# Patient Record
Sex: MALE | Race: WHITE | Employment: UNEMPLOYED | URBAN - METROPOLITAN AREA
[De-identification: names, ages, dates, MRNs, and addresses within clinical notes are randomized per-mention and may not be internally consistent; named-entity substitution may affect disease eponyms.]

---

## 2018-06-07 ENCOUNTER — OFFICE VISIT (OUTPATIENT)
Dept: ORTHOPEDIC SURGERY | Age: 23
End: 2018-06-07

## 2018-06-07 VITALS
RESPIRATION RATE: 16 BRPM | DIASTOLIC BLOOD PRESSURE: 60 MMHG | HEIGHT: 78 IN | BODY MASS INDEX: 31.59 KG/M2 | WEIGHT: 273 LBS | SYSTOLIC BLOOD PRESSURE: 124 MMHG

## 2018-06-07 DIAGNOSIS — S61.411A LACERATION OF RIGHT HAND WITHOUT FOREIGN BODY, INITIAL ENCOUNTER: Primary | ICD-10-CM

## 2018-06-07 PROCEDURE — 4004F PT TOBACCO SCREEN RCVD TLK: CPT | Performed by: ORTHOPAEDIC SURGERY

## 2018-06-07 PROCEDURE — G8417 CALC BMI ABV UP PARAM F/U: HCPCS | Performed by: ORTHOPAEDIC SURGERY

## 2018-06-07 PROCEDURE — 99203 OFFICE O/P NEW LOW 30 MIN: CPT | Performed by: ORTHOPAEDIC SURGERY

## 2018-06-07 PROCEDURE — G8427 DOCREV CUR MEDS BY ELIG CLIN: HCPCS | Performed by: ORTHOPAEDIC SURGERY

## 2018-06-21 ENCOUNTER — OFFICE VISIT (OUTPATIENT)
Dept: ORTHOPEDIC SURGERY | Age: 23
End: 2018-06-21

## 2018-06-21 VITALS
SYSTOLIC BLOOD PRESSURE: 122 MMHG | DIASTOLIC BLOOD PRESSURE: 60 MMHG | RESPIRATION RATE: 16 BRPM | HEIGHT: 78 IN | BODY MASS INDEX: 31.59 KG/M2 | WEIGHT: 273 LBS

## 2018-06-21 DIAGNOSIS — S61.411A LACERATION OF RIGHT HAND WITHOUT FOREIGN BODY, INITIAL ENCOUNTER: Primary | ICD-10-CM

## 2018-06-21 PROCEDURE — G8427 DOCREV CUR MEDS BY ELIG CLIN: HCPCS | Performed by: ORTHOPAEDIC SURGERY

## 2018-06-21 PROCEDURE — 99212 OFFICE O/P EST SF 10 MIN: CPT | Performed by: ORTHOPAEDIC SURGERY

## 2018-06-21 PROCEDURE — G8417 CALC BMI ABV UP PARAM F/U: HCPCS | Performed by: ORTHOPAEDIC SURGERY

## 2018-06-21 PROCEDURE — 4004F PT TOBACCO SCREEN RCVD TLK: CPT | Performed by: ORTHOPAEDIC SURGERY

## 2021-02-25 NOTE — PROGRESS NOTES
Date: 2/26/2021                                               Subjective/Objective:     Chief Complaint   Patient presents with    New Patient     pt has a family hx of diabetes, also concerned about his weight gain    Hypertension       HPI     Ambreen Amador is a 22 yo male, new patient. Here to establish care. Here with nurse from [facility]. Works at [email or web address]. Lives at group home. History of paranoid psychosis - 2016. Was admitted to [facility]. Schizophrenia versus delusional disorder. Was started on Invega injections, now on [place]. Follows with psychiatry at [facility]- has been seeing via telehealth. Is followed every 3 months. Nurse with him today believes he was diagnosed with either schizophrenia or schizoaffective disorder. Nurse reports patient is compliant with his injections and psychiatry follow-up. Patient reports that his mood has been doing well. He reports that he feels overall happy. He denies thoughts of harming himself. He does report a suicide attempts with a knife as a child. Denies any other suicide attempts. Does feel down that he can't live with family. Has been living at group home for about a year, he enjoys living there. Sees his dad usually about one month. His mom he sees every week, usually stays at her house one night a week and he enjoys this. He reports that he was previously treated for depression with sertraline. He reports that he stopped taking this because he did not like the way it made him feel. He endorses being very thirsty and hungry \"every once in a while. \"  Nurse with patient today reports that mother expresses concern for diabetes given that there is no reported family history of diabetes. Walks to and from work everyday - 20 minute walk. Works at [email or web address], eats at work. Drinks sodas. Works 5 am - noon. Usually eats breakfast at work.      Age/Gender Health Maintenance     Lipid -needs  DM Screen -needs  Smoked cigarettes for about one month then quit     Tetanus - UTD 6/2018     HIV Screening - low risk  Hep C Screening- low risk           Patient Active Problem List    Diagnosis Date Noted    Laceration of right hand without foreign body 06/07/2018    Schizophreniform disorder with good prognostic features ([facility] [address] 11/10/2016    Psychological trauma 11/10/2016    Tourette's syndrome 11/10/2016    Impaired social interaction 11/10/2016    Suicidal ideation 11/10/2016    History of OCD (obsessive compulsive disorder)        Past Medical History:   Diagnosis Date    ADHD (attention deficit hyperactivity disorder)     dx young child    Bipolar 1 disorder, manic, mild ([facility] [address]     dx as a child    History of OCD (obsessive compulsive disorder)     dx as a child    Tourette syndrome     dx as a child       Past Surgical History:   Procedure Laterality Date    TONSILLECTOMY         Admission on 11/08/2016, Discharged on 11/09/2016   Component Date Value Ref Range Status    Amphetamine Screen, Urine 11/08/2016 Neg  Negative <1000ng/mL Final    Barbiturate Screen, Ur 11/08/2016 Neg  Negative <200 ng/mL Final    Benzodiazepine Screen, Urine 11/08/2016 Neg  Negative <200 ng/mL Final    Cannabinoid Scrn, Ur 11/08/2016 Neg  Negative <50 ng/mL Final    Cocaine Metabolite Screen, Urine 11/08/2016 Neg  Negative <300 ng/mL Final    Opiate Scrn, Ur 11/08/2016 Neg  Negative <300 ng/mL Final    Comment: \"Therapeutic levels of pain medication, especially oxycontin and synthetic  opioids, may not be detected by this Methodology. Pain management screen  panel  Drug panel-PM-Hi Res Ur, Interp (PAIN) should be considered for drug  monitoring \".       PCP Screen, Urine 11/08/2016 Neg  Negative <25 ng/mL Final    Methadone Screen, Urine 11/08/2016 Neg  Negative <300 ng/mL Final    Propoxyphene Scrn, Ur 11/08/2016 Neg  Negative <300 ng/mL Final    pH, UA 11/08/2016 7.0   Final    Comment: Urine pH less than 5.0 or greater than 8.0 may indicate sample adulteration. Another sample should be collected if clinically  indicated.  Drug Screen Comment: 11/08/2016 see below   Final    Comment: This method is a screening test to detect only these drug  classes as part of a medical workup. Confirmatory testing  by another method should be ordered if clinically indicated.  Oxycodone Urine 11/08/2016 Neg  Negative <100 ng/ml Final    Acetaminophen Level 11/08/2016 <15  10 - 30 ug/mL Final    Comment: Therapeutic Range: 10.0-30.0 ug/mL  Toxic: >851.8 ug/mL      Salicylate, Serum 70/91/1889 <0.3* 15.0 - 30.0 mg/dL Final    Comment: Therapeutic Range: 15.0-30.0 mg/dL  Toxic: >30.0 mg/dL      Ethanol Lvl 11/08/2016 None Detected  mg/dL Final    Comment:    None Detected  Conversion factor:  100 mg/dl = .100 g/dl  For Medical Purposes Only      Sodium 11/08/2016 142  136 - 145 mmol/L Final    Potassium 11/08/2016 3.9  3.5 - 5.1 mmol/L Final    Chloride 11/08/2016 101  99 - 110 mmol/L Final    CO2 11/08/2016 26  21 - 32 mmol/L Final    Anion Gap 11/08/2016 15  3 - 16 Final    Glucose 11/08/2016 97  70 - 99 mg/dL Final    BUN 11/08/2016 <2* 7 - 20 mg/dL Final    CREATININE 11/08/2016 1.0  0.9 - 1.3 mg/dL Final    GFR Non- 11/08/2016 >60  >60 Final    Comment: >60 mL/min/1.73m2 EGFR, calc. for ages 25 and older using the  MDRD formula (not corrected for weight), is valid for stable  renal function.  GFR  11/08/2016 >60  >60 Final    Comment: Chronic Kidney Disease: less than 60 ml/min/1.73 sq.m. Kidney Failure: less than 15 ml/min/1.73 sq.m. Results valid for patients 18 years and older.       Calcium 11/08/2016 9.7  8.3 - 10.6 mg/dL Final    Total Protein 11/08/2016 7.2  6.4 - 8.2 g/dL Final    Albumin 11/08/2016 4.4  3.4 - 5.0 g/dL Final    Albumin/Globulin Ratio 11/08/2016 1.6  1.1 - 2.2 Final    Total Bilirubin 11/08/2016 0.4  0.0 - 1.0 mg/dL Final    Alkaline Phosphatase 11/08/2016 77  40 - 129 U/L Final    ALT 11/08/2016 16  10 - 40 U/L Final    AST 11/08/2016 17  15 - 37 U/L Final    Globulin 11/08/2016 2.8  g/dL Final    WBC 11/08/2016 8.9  4.0 - 11.0 K/uL Final    RBC 11/08/2016 5.46  4.20 - 5.90 M/uL Final    Hemoglobin 11/08/2016 15.4  13.5 - 17.5 g/dL Final    Hematocrit 11/08/2016 45.6  40.5 - 52.5 % Final    MCV 11/08/2016 83.6  80.0 - 100.0 fL Final    MCH 11/08/2016 28.3  26.0 - 34.0 pg Final    MCHC 11/08/2016 33.9  31.0 - 36.0 g/dL Final    RDW 11/08/2016 13.0  12.4 - 15.4 % Final    Platelets 64/37/9808 148  135 - 450 K/uL Final    MPV 11/08/2016 9.2  5.0 - 10.5 fL Final    Neutrophils % 11/08/2016 72.9  % Final    Lymphocytes % 11/08/2016 20.8  % Final    Monocytes % 11/08/2016 5.8  % Final    Eosinophils % 11/08/2016 0.2  % Final    Basophils % 11/08/2016 0.3  % Final    Neutrophils Absolute 11/08/2016 6.5  1.7 - 7.7 K/uL Final    Lymphocytes Absolute 11/08/2016 1.9  1.0 - 5.1 K/uL Final    Monocytes Absolute 11/08/2016 0.5  0.0 - 1.3 K/uL Final    Eosinophils Absolute 11/08/2016 0.0  0.0 - 0.6 K/uL Final    Basophils Absolute 11/08/2016 0.0  0.0 - 0.2 K/uL Final    Mono Test 11/08/2016 Negative  Negative Final    Rapid Influenza A Ag 11/08/2016 Negative  Negative Final    Rapid Influenza B Ag 11/08/2016 Negative  Negative Final    TSH Reflex FT4 11/08/2016 2.46  0.43 - 4.00 uIU/mL Final    Ventricular Rate 11/08/2016 70  BPM Final    Atrial Rate 11/08/2016 70  BPM Final    P-R Interval 11/08/2016 178  ms Final    QRS Duration 11/08/2016 86  ms Final    Q-T Interval 11/08/2016 344  ms Final    QTc Calculation (Bazett) 11/08/2016 371  ms Final    P Axis 11/08/2016 44  degrees Final    R Axis 11/08/2016 -6  degrees Final    T Axis 11/08/2016 31  degrees Final    Diagnosis 11/08/2016    Final                    Value:Sinus rhythm with marked sinus arrhythmia  Otherwise normal ECG  No previous ECGs available  Confirmed by [facility] MD, Marilu Parsons ([phone]) on 11/9/2016 8:39:37 AM      Color, UA 11/08/2016 Yellow  Straw/Yellow Final    Clarity, UA 11/08/2016 Clear  Clear Final    Glucose, Ur 11/08/2016 Negative  Negative mg/dL Final    Bilirubin Urine 11/08/2016 Negative  Negative Final    Ketones, Urine 11/08/2016 Negative  Negative mg/dL Final    Specific [place], UA 11/08/2016 1.010  1.005 - 1.030 Final    Blood, Urine 11/08/2016 Negative  Negative Final    pH, UA 11/08/2016 7.0  5.0 - 8.0 Final    Protein, UA 11/08/2016 Negative  Negative mg/dL Final    Urobilinogen, Urine 11/08/2016 0.2  <2.0 E.U./dL Final    Nitrite, Urine 11/08/2016 Negative  Negative Final    Leukocyte Esterase, Urine 11/08/2016 Negative  Negative Final    Microscopic Examination 11/08/2016 Not Indicated   Final    Urine Reflex to Culture 11/08/2016 Not Indicated   Final    Urine Type 11/08/2016 Not Specified   Final       Family History   Problem Relation Age of Onset    Mental Illness Paternal Grandfather     Depression Mother     Anxiety Disorder Mother        Current Outpatient Medications   Medication Sig Dispense Refill    INVEGA TRINZA 546 MG/1.75ML RONNIE IM injection        No current facility-administered medications for this visit. Allergies   Allergen Reactions    Amoxicillin Hives       Review of Systems   Constitutional: Negative for chills and fever. Respiratory: Negative for cough and shortness of breath. Cardiovascular: Negative for chest pain. Gastrointestinal: Negative for constipation, diarrhea, nausea and vomiting. Endocrine: Positive for polydipsia and polyphagia. Negative for polyuria. Neurological: Negative for dizziness and headaches. Psychiatric/Behavioral: Negative for sleep disturbance and suicidal ideas. Vitals:  /79   Pulse 78   Temp 97 °F (36.1 °C) (Temporal)   Ht 6' 6\" (1.981 m)   Wt (!) 313 lb (142 kg)   BMI 36.17 kg/m²     Physical Exam  Constitutional:       General: He is not in acute distress.      Appearance: Normal appearance. He is obese. HENT:      Head: Normocephalic and atraumatic. Neck:      Musculoskeletal: Normal range of motion. Cardiovascular:      Rate and Rhythm: Normal rate and regular rhythm. Pulses: Normal pulses. Heart sounds: Normal heart sounds. No murmur. Pulmonary:      Effort: Pulmonary effort is normal. No respiratory distress. Breath sounds: Normal breath sounds. Abdominal:      General: There is no distension. Palpations: Abdomen is soft. Musculoskeletal: Normal range of motion. Skin:     General: Skin is warm and dry. Neurological:      General: No focal deficit present. Mental Status: He is alert and oriented to person, place, and time. Mental status is at baseline. Psychiatric:         Mood and Affect: Mood normal.         Behavior: Behavior normal.         Thought Content: Thought content normal.         Judgment: Judgment normal.         Assessment/Plan     1. Schizophrenia, unspecified type [facility]): Invega injections, follows with [facility]. 2. Depression, unspecified depression type: denies SI. He feels his mood is doing well. Follows with psychiatry at [facility]. 3. Family history of diabetes mellitus  - Hemoglobin A1C; Future    4. Obesity (BMI 35.0-39.9 without comorbidity): Advised diet and lifestyle modifications  - Lipid, Fasting; Future  - Hemoglobin A1C; Future    5. Preventative health care  - COMPREHENSIVE METABOLIC PANEL; Future  - TSH with Reflex; Future  - CBC WITH AUTO DIFFERENTIAL;  Future  - VITAMIN D 25 HYDROXY; Future      Orders Placed This Encounter   Procedures    COMPREHENSIVE METABOLIC PANEL     Standing Status:   Future     Standing Expiration Date:   2/25/2022    TSH with Reflex     Standing Status:   Future     Standing Expiration Date:   2/25/2022    CBC WITH AUTO DIFFERENTIAL     Standing Status:   Future     Standing Expiration Date:   2/25/2022    VITAMIN D 25 HYDROXY     Standing Status:   Future     Standing Expiration Date: 2/25/2022    Hemoglobin A1C     Standing Status:   Future     Standing Expiration Date:   2/26/2022    Lipid, Fasting     Standing Status:   Future     Standing Expiration Date:   2/26/2022       Return in about 6 months (around 8/26/2021). OR sooner with questions, concerns, worsening symptoms    SARAH RICHTER NP    2/26/2021  4:52 PM    Discussed use, benefit, and side effects of prescribed medications. Barriers to medication compliance addressed. Discussed all ordered testing and labs. All patient questions answered. Patient agreeable with plan above. Please note that this chart was generated using dragon dictation software. Although every effort was made to ensure the accuracy of this automated transcription, some errors in transcription may have occurred.

## 2021-02-25 NOTE — PATIENT INSTRUCTIONS
Complete labs  Goal is to exercise (moderate intensity) for at least 150 min a week. Exercising at least 3-4 days a week is best.   Reduce soda intake, goal is to stop drinking it all together.

## 2021-02-26 ENCOUNTER — OFFICE VISIT (OUTPATIENT)
Dept: PRIMARY CARE CLINIC | Age: 26
End: 2021-02-26
Payer: COMMERCIAL

## 2021-02-26 VITALS
TEMPERATURE: 97 F | HEART RATE: 78 BPM | DIASTOLIC BLOOD PRESSURE: 79 MMHG | WEIGHT: 313 LBS | HEIGHT: 78 IN | BODY MASS INDEX: 36.21 KG/M2 | SYSTOLIC BLOOD PRESSURE: 139 MMHG

## 2021-02-26 DIAGNOSIS — Z00.00 PREVENTATIVE HEALTH CARE: ICD-10-CM

## 2021-02-26 DIAGNOSIS — F20.9 SCHIZOPHRENIA, UNSPECIFIED TYPE (HCC): Primary | ICD-10-CM

## 2021-02-26 DIAGNOSIS — F32.A DEPRESSION, UNSPECIFIED DEPRESSION TYPE: ICD-10-CM

## 2021-02-26 DIAGNOSIS — Z83.3 FAMILY HISTORY OF DIABETES MELLITUS: ICD-10-CM

## 2021-02-26 DIAGNOSIS — E66.9 OBESITY (BMI 35.0-39.9 WITHOUT COMORBIDITY): ICD-10-CM

## 2021-02-26 PROCEDURE — G8484 FLU IMMUNIZE NO ADMIN: HCPCS | Performed by: NURSE PRACTITIONER

## 2021-02-26 PROCEDURE — 4004F PT TOBACCO SCREEN RCVD TLK: CPT | Performed by: NURSE PRACTITIONER

## 2021-02-26 PROCEDURE — 99203 OFFICE O/P NEW LOW 30 MIN: CPT | Performed by: NURSE PRACTITIONER

## 2021-02-26 PROCEDURE — G8417 CALC BMI ABV UP PARAM F/U: HCPCS | Performed by: NURSE PRACTITIONER

## 2021-02-26 PROCEDURE — G8427 DOCREV CUR MEDS BY ELIG CLIN: HCPCS | Performed by: NURSE PRACTITIONER

## 2021-02-26 RX ORDER — PALIPERIDONE PALMITATE 546 MG/1.75ML
INJECTION, SUSPENSION, EXTENDED RELEASE INTRAMUSCULAR
COMMUNITY
Start: 2021-01-20

## 2021-02-26 ASSESSMENT — ENCOUNTER SYMPTOMS
NAUSEA: 0
SHORTNESS OF BREATH: 0
CONSTIPATION: 0
COUGH: 0
DIARRHEA: 0
VOMITING: 0

## 2021-03-02 DIAGNOSIS — Z00.00 PREVENTATIVE HEALTH CARE: ICD-10-CM

## 2021-03-02 DIAGNOSIS — E66.9 OBESITY (BMI 35.0-39.9 WITHOUT COMORBIDITY): ICD-10-CM

## 2021-03-02 DIAGNOSIS — Z83.3 FAMILY HISTORY OF DIABETES MELLITUS: ICD-10-CM

## 2021-03-02 LAB
A/G RATIO: 1.6 (ref 1.1–2.2)
ALBUMIN SERPL-MCNC: 4.7 G/DL (ref 3.4–5)
ALP BLD-CCNC: 100 U/L (ref 40–129)
ALT SERPL-CCNC: 17 U/L (ref 10–40)
ANION GAP SERPL CALCULATED.3IONS-SCNC: 18 MMOL/L (ref 3–16)
AST SERPL-CCNC: 16 U/L (ref 15–37)
BASOPHILS ABSOLUTE: 0 K/UL (ref 0–0.2)
BASOPHILS RELATIVE PERCENT: 0.3 %
BILIRUB SERPL-MCNC: 0.7 MG/DL (ref 0–1)
BUN BLDV-MCNC: 15 MG/DL (ref 7–20)
CALCIUM SERPL-MCNC: 9.9 MG/DL (ref 8.3–10.6)
CHLORIDE BLD-SCNC: 104 MMOL/L (ref 99–110)
CHOLESTEROL, FASTING: 196 MG/DL (ref 0–199)
CO2: 20 MMOL/L (ref 21–32)
CREAT SERPL-MCNC: 1.1 MG/DL (ref 0.9–1.3)
EOSINOPHILS ABSOLUTE: 0 K/UL (ref 0–0.6)
EOSINOPHILS RELATIVE PERCENT: 0.3 %
GFR AFRICAN AMERICAN: >60
GFR NON-AFRICAN AMERICAN: >60
GLOBULIN: 3 G/DL
GLUCOSE BLD-MCNC: 72 MG/DL (ref 70–99)
HCT VFR BLD CALC: 46.2 % (ref 40.5–52.5)
HDLC SERPL-MCNC: 38 MG/DL (ref 40–60)
HEMOGLOBIN: 15.3 G/DL (ref 13.5–17.5)
LDL CHOLESTEROL CALCULATED: 146 MG/DL
LYMPHOCYTES ABSOLUTE: 2.2 K/UL (ref 1–5.1)
LYMPHOCYTES RELATIVE PERCENT: 27.9 %
MCH RBC QN AUTO: 28.2 PG (ref 26–34)
MCHC RBC AUTO-ENTMCNC: 33 G/DL (ref 31–36)
MCV RBC AUTO: 85.4 FL (ref 80–100)
MONOCYTES ABSOLUTE: 0.4 K/UL (ref 0–1.3)
MONOCYTES RELATIVE PERCENT: 5.5 %
NEUTROPHILS ABSOLUTE: 5.3 K/UL (ref 1.7–7.7)
NEUTROPHILS RELATIVE PERCENT: 66 %
PDW BLD-RTO: 13.7 % (ref 12.4–15.4)
PLATELET # BLD: 153 K/UL (ref 135–450)
PMV BLD AUTO: 10.6 FL (ref 5–10.5)
POTASSIUM SERPL-SCNC: 4 MMOL/L (ref 3.5–5.1)
RBC # BLD: 5.41 M/UL (ref 4.2–5.9)
SODIUM BLD-SCNC: 142 MMOL/L (ref 136–145)
TOTAL PROTEIN: 7.7 G/DL (ref 6.4–8.2)
TRIGLYCERIDE, FASTING: 60 MG/DL (ref 0–150)
TSH REFLEX: 1.31 UIU/ML (ref 0.27–4.2)
VITAMIN D 25-HYDROXY: 24.5 NG/ML
VLDLC SERPL CALC-MCNC: 12 MG/DL
WBC # BLD: 8 K/UL (ref 4–11)

## 2021-03-03 LAB
ESTIMATED AVERAGE GLUCOSE: 99.7 MG/DL
HBA1C MFR BLD: 5.1 %

## 2021-03-04 DIAGNOSIS — E55.9 VITAMIN D INSUFFICIENCY: Primary | ICD-10-CM

## 2021-03-04 RX ORDER — MELATONIN
1000 DAILY
Qty: 30 TABLET | Refills: 5 | Status: SHIPPED | OUTPATIENT
Start: 2021-03-04 | End: 2022-01-12

## 2021-04-14 ENCOUNTER — NURSE TRIAGE (OUTPATIENT)
Dept: OTHER | Facility: CLINIC | Age: 26
End: 2021-04-14

## 2021-04-14 ENCOUNTER — OFFICE VISIT (OUTPATIENT)
Dept: PRIMARY CARE CLINIC | Age: 26
End: 2021-04-14
Payer: COMMERCIAL

## 2021-04-14 VITALS
HEIGHT: 78 IN | HEART RATE: 88 BPM | WEIGHT: 305.8 LBS | OXYGEN SATURATION: 99 % | BODY MASS INDEX: 35.38 KG/M2 | SYSTOLIC BLOOD PRESSURE: 118 MMHG | DIASTOLIC BLOOD PRESSURE: 83 MMHG | TEMPERATURE: 98.6 F

## 2021-04-14 DIAGNOSIS — K64.2 GRADE III HEMORRHOIDS: ICD-10-CM

## 2021-04-14 DIAGNOSIS — R19.7 DIARRHEA, UNSPECIFIED TYPE: ICD-10-CM

## 2021-04-14 DIAGNOSIS — K64.2 GRADE III HEMORRHOIDS: Primary | ICD-10-CM

## 2021-04-14 PROCEDURE — 1036F TOBACCO NON-USER: CPT | Performed by: INTERNAL MEDICINE

## 2021-04-14 PROCEDURE — G8427 DOCREV CUR MEDS BY ELIG CLIN: HCPCS | Performed by: INTERNAL MEDICINE

## 2021-04-14 PROCEDURE — G8417 CALC BMI ABV UP PARAM F/U: HCPCS | Performed by: INTERNAL MEDICINE

## 2021-04-14 PROCEDURE — 99215 OFFICE O/P EST HI 40 MIN: CPT | Performed by: INTERNAL MEDICINE

## 2021-04-14 RX ORDER — LOPERAMIDE HYDROCHLORIDE 2 MG/1
2 CAPSULE ORAL 4 TIMES DAILY PRN
COMMUNITY
Start: 2021-04-14 | End: 2021-05-17

## 2021-04-14 RX ORDER — HYDROCORTISONE 25 MG/G
CREAM TOPICAL 2 TIMES DAILY
Qty: 60 G | Refills: 1 | Status: SHIPPED | OUTPATIENT
Start: 2021-04-14 | End: 2021-09-13

## 2021-04-14 ASSESSMENT — ENCOUNTER SYMPTOMS
EYE PAIN: 0
NAUSEA: 0
ANAL BLEEDING: 1
ABDOMINAL DISTENTION: 0
BLOOD IN STOOL: 1
EYE ITCHING: 0
RECTAL PAIN: 1
PHOTOPHOBIA: 0
ABDOMINAL PAIN: 1
VOMITING: 0
EYE REDNESS: 0
RESPIRATORY NEGATIVE: 1
DIARRHEA: 1
EYE DISCHARGE: 0
CONSTIPATION: 1

## 2021-04-14 NOTE — PROGRESS NOTES
Nadine Boateng (:  1995) is a 23 y.o. male,Established patient, here for evaluation of the following chief complaint(s):  Constipation (DIARRHEA, BLEEDING AND CONSTIPATION  x 1year)  The patient admits to rectal bleeding for weeks and pain with defecation. He states he had this evaluated before but nothing was done about it. He also has diarrhea off and on and occasional constipation. He works at [employer] and eats that diet every day. He was counseled on improving his diet and weight loss. ASSESSMENT/PLAN:  1. Grade III hemorrhoids  -     hydrocortisone (ANUSOL-HC) 2.5 % CREA rectal cream; Place rectally 2 times daily, Rectal, 2 TIMES DAILY Starting Wed 2021, Disp-60 g, R-1, Normal  -     CBC WITH AUTO DIFFERENTIAL; Future  -     loperamide (IMODIUM) 2 MG capsule; Take 1 capsule by mouth 4 times daily as needed for Hraunás MD Kenrick, Colorectal Surgery, [facility]  2. Diarrhea, unspecified type  -     CLOSTRIDIUM DIFFICILE RAPID EIA; Future  -     Basic metabolic panel; Future  -     Amylase; Future  -     Comprehensive metabolic panel; Future  -     Ova and parasite screen; Future  -     Occult blood x 1, stool; Future  -     Fecal leukocytes; Future  -     CULTURE STOOL #1; Future      Return in about 2 months (around 2021). SUBJECTIVE/OBJECTIVE:  HPI    Review of Systems   Constitutional: Negative for activity change, appetite change, chills, diaphoresis, fatigue, fever and unexpected weight change. HENT: Negative. Eyes: Negative for photophobia, pain, discharge, redness, itching and visual disturbance. Respiratory: Negative. Cardiovascular: Negative. Gastrointestinal: Positive for abdominal pain, anal bleeding, blood in stool, constipation, diarrhea and rectal pain. Negative for abdominal distention, nausea and vomiting. Genitourinary: Negative. Musculoskeletal: Negative. Skin: Negative. Neurological: Negative. Psychiatric/Behavioral: Negative. Physical Exam  Constitutional:       Appearance: He is well-developed. HENT:      Head: Normocephalic and atraumatic. Eyes:      Conjunctiva/sclera: Conjunctivae normal.      Pupils: Pupils are equal, round, and reactive to light. Neck:      Musculoskeletal: Normal range of motion and neck supple. Cardiovascular:      Rate and Rhythm: Normal rate and regular rhythm. Heart sounds: Normal heart sounds. Pulmonary:      Effort: Pulmonary effort is normal.      Breath sounds: Normal breath sounds. Abdominal:      General: Abdomen is flat. There is no distension. Palpations: Abdomen is soft. There is no mass. Tenderness: There is no abdominal tenderness. There is no guarding or rebound. Hernia: No hernia is present. Musculoskeletal: Normal range of motion. Skin:     General: Skin is warm and dry. Neurological:      Mental Status: He is alert and oriented to person, place, and time. Psychiatric:         Behavior: Behavior normal.         Thought Content: Thought content normal.           On this date 4/14/2021 I have spent 40 minutes reviewing previous notes, test results and face to face with the patient discussing the diagnosis and importance of compliance with the treatment plan as well as documenting on the day of the visit. An electronic signature was used to authenticate this note.     --Yogesh Buenrostro MD col

## 2021-04-14 NOTE — TELEPHONE ENCOUNTER
Received call from [facility] at pre-service center [facility]) [address] with Red Flag Complaint. Brief description of triage: Pt and pt's health  are on line for pt stated intermittent diarrhea, constipation and lately has been using 4 rolls of toilet paper and is \"chaffing down there until it like bleeds around the butt hole. \" Pt denies blood in stool. States last night he had blood when wiping, but did not notice any in the bowl or in watery stool. Pt states diarrhea x2-3 days and then constipation x2-3 and the cycle will repeat. Pt has taken miralax in past to help with this. Triage indicates for patient to be seen by PCP within 3 days. Care advice provided, patient verbalizes understanding; denies any other questions or concerns; instructed to call back for any new or worsening symptoms. Writer provided warm transfer to [facility] at [facility] for appointment scheduling. Attention Provider: Thank you for allowing me to participate in the care of your patient. The patient was connected to triage in response to information provided to the E[facility]. Please do not respond through this encounter as the response is not directed to a shared pool. Reason for Disposition   Patient wants to be seen    Answer Assessment - Initial Assessment Questions  1. DIARRHEA SEVERITY: \"How bad is the diarrhea? \" \"How many extra stools have you had in the past 24 hours than normal?\"     - NO DIARRHEA (SCALE 0)    - MILD (SCALE 1-3): Few loose or mushy BMs; increase of 1-3 stools over normal daily number of stools; mild increase in ostomy output. -  MODERATE (SCALE 4-7): Increase of 4-6 stools daily over normal; moderate increase in ostomy output. * SEVERE (SCALE 8-10; OR 'WORST POSSIBLE'): Increase of 7 or more stools daily over normal; moderate increase in ostomy output; incontinence. Pt states quite a few per week-2-3 x per week, sometimes its a few days without pooping    2.  ONSET: \"When did the diarrhea begin?\"       Last night    3. BM CONSISTENCY: \"How loose or watery is the diarrhea? \"       Pt states loose and watery last night    4. VOMITING: \"Are you also vomiting? \" If so, ask: \"How many times in the past 24 hours? \"       Denies    5. ABDOMINAL PAIN: Stormy Chacho you having any abdominal pain? \" If yes: \"What does it feel like? \" (e.g., crampy, dull, intermittent, constant)       Intermittent abd pain    6. ABDOMINAL PAIN SEVERITY: If present, ask: \"How bad is the pain? \"  (e.g., Scale 1-10; mild, moderate, or severe)    - MILD (1-3): doesn't interfere with normal activities, abdomen soft and not tender to touch     - MODERATE (4-7): interferes with normal activities or awakens from sleep, tender to touch     - SEVERE (8-10): excruciating pain, doubled over, unable to do any normal activities        Pt denies pain right now    7. ORAL INTAKE: If vomiting, \"Have you been able to drink liquids? \" \"How much fluids have you had in the past 24 hours? \"      Pt eating and drinking well for him    8. HYDRATION: \"Any signs of dehydration? \" (e.g., dry mouth [not just dry lips], too weak to stand, dizziness, new weight loss) \"When did you last urinate? \"      Pt states he lost 10 lbs in 2 weeks to a month    9. EXPOSURE: \"Have you traveled to a foreign country recently? \" \"Have you been exposed to anyone with diarrhea? \" \"Could you have eaten any food that was spoiled? \"      Denies    10. ANTIBIOTIC USE: \"Are you taking antibiotics now or have you taken antibiotics in the past 2 months? \"        Denies    11. OTHER SYMPTOMS: \"Do you have any other symptoms? \" (e.g., fever, blood in stool)        Pt states blood when he wipes, chaffing and using 4 rolls of toilet paper per week    12. PREGNANCY: \"Is there any chance you are pregnant? \" \"When was your last menstrual period? \"        N/A    Protocols used: [id number]

## 2021-04-14 NOTE — ASSESSMENT & PLAN NOTE
Uncontrolled, continue current treatment plan     Referred to colorectal surgery. Lab review as indicated. Start on Anusol cream and a stool softener.

## 2021-04-14 NOTE — PATIENT INSTRUCTIONS
ounce-equivalents of grains, such as cereals, breads, crackers, rice, or pasta, every day. An ounce-equivalent is 1 slice of bread, 1 cup of ready-to-eat cereal, or ½ cup of cooked rice, cooked pasta, or cooked cereal.  ? 2½ cups of vegetables, especially:  § Dark-green vegetables such as broccoli and spinach. § Orange vegetables such as carrots and sweet potatoes. § Dry beans (such as ugarte and kidney beans) and peas (such as lentils). ? 2 cups of fresh, frozen, or canned fruit. A small apple or 1 banana or orange equals 1 cup. ? 3 cups of nonfat or low-fat milk, yogurt, or other milk products. ? 5½ ounces of meat and beans, such as chicken, fish, lean meat, beans, nuts, and seeds. One egg, 1 tablespoon of peanut butter, ½ ounce nuts or seeds, or ¼ cup of cooked beans equals 1 ounce of meat. · Learn how to read food labels for serving sizes and ingredients. Fast-food and convenience-food meals often contain few or no fruits or vegetables. Make sure you eat some fruits and vegetables to make the meal more nutritious. · Look at your food diary. For each food group, add up what you have eaten and then divide the total by the number of days. This will give you an idea of how much you are eating from each food group. See if you can find some ways to change your diet to make it more healthy. Start small  · Do not try to make dramatic changes to your diet all at once. You might feel that you are missing out on your favorite foods and then be more likely to fail. · Start slowly, and gradually change your habits. Try some of the following:  ? Use whole wheat bread instead of white bread. ? Use nonfat or low-fat milk instead of whole milk. ? Eat brown rice instead of white rice, and eat whole wheat pasta instead of white-flour pasta. ? Try low-fat cheeses and low-fat yogurt. ? Add more fruits and vegetables to meals and have them for snacks. ? Add lettuce, tomato, cucumber, and onion to sandwiches.   ? Add fruit to yogurt and cereal.  Enjoy food  · You can still eat your favorite foods. You just may need to eat less of them. If your favorite foods are high in fat, salt, and sugar, limit how often you eat them, but do not cut them out entirely. · Eat a wide variety of foods. Make healthy choices when eating out  · The type of restaurant you choose can help you make healthy choices. Even fast-food chains are now offering more low-fat or healthier choices on the menu. · Choose smaller portions, or take half of your meal home. · When eating out, try:  ? A veggie pizza with a whole wheat crust or grilled chicken (instead of sausage or pepperoni). ? Pasta with roasted vegetables, grilled chicken, or marinara sauce instead of cream sauce. ? A vegetable wrap or grilled chicken wrap. ? Broiled or poached food instead of fried or breaded items. Make healthy choices easy  · Buy packaged, prewashed, ready-to-eat fresh vegetables and fruits, such as baby carrots, salad mixes, and chopped or shredded broccoli and cauliflower. · Buy packaged, presliced fruits, such as melon or pineapple. · Choose 100% fruit or vegetable juice instead of soda. Limit juice intake to 4 to 6 oz (½ to ¾ cup) a day. · Blend low-fat yogurt, fruit juice, and canned or frozen fruit to make a smoothie for breakfast or a snack. Where can you learn more? Go to https://Send Word NowsadiEtive Technologies.healthImmunovative Therapies. org and sign in to your [employer] account. Enter G604 in the [facility] box to learn more about \"Eating Healthy Foods: Care Instructions. \"     If you do not have an account, please click on the \"Sign Up Now\" link. Current as of: December 17, 2020               Content Version: 12.8  © 2006-2021 Healthwise, Incorporated. Care instructions adapted under license by [facility] ([facility]).  If you have questions about a medical condition or this instruction, always ask your healthcare professional. [place] [address] any warranty or liability for your use of this information. Patient Education        7 Tips for Eating Healthy When [facility] All the Time    Make quick meals on busy nights. Try recipes that are simple to make and easy to clean up, like pasta, soups, or casseroles. These dishes can often be made ahead of time and are easy to reheat when you're short on time. Buy foods that last.  Choose fresh foods, such as onions, garlic, and potatoes. You can also reach for frozen, canned, and dried foods. Foods like these last and can help you pull a healthy meal together quickly. Wil Nim something new. Try checking out cookbooks from [employer]. Or search for new recipes online. You can also change the ingredients in an old favorite recipe. Or switch the seasonings to create something new. Try theme nights. Try \"Taco Tuesday. \" Do \"Meatless Monday\" for a vegetarian meal each week. And save \"Leftovers Night\" for days when you don't want to cook. Let your favorite dishes guide you. Then make them again every few weeks. Wil Nim with a friend. Maybe you know someone who's feeling the same way about healthy eating. Pick a recipe and schedule a night to make it \"together. \" You can also video call while you cook or eat. Share food with other people. If you like cooking and baking, you can share what you've made. Split up what you've made and keep only what you want to eat. You can wrap up the rest to share with a friend, neighbor, or family member. Aim for balance, variety, and moderation. On most days, eat from each food groupgrains, protein foods, vegetables, fruits, and dairy. And choose different foods from each group. For example, if you often eat apples, try reaching for a banana instead. All foods, if you eat them in moderation, can be part of healthy eating. Current as of: December 17, 2020               Content Version: 12.8  © 6277-8821 Healthwise, Incorporated.    Care instructions adapted under license by [facility]. If you have questions about a medical condition or this instruction, always ask your healthcare professional. [address] any warranty or liability for your use of this information. Patient Education        Learning About Healthy Weight  What is a healthy weight? A healthy weight is the weight at which you feel good about yourself and have energy for work and play. It's also one that lowers your risk for health problems. What can you do to stay at a healthy weight? It can be hard to stay at a healthy weight, especially when fast food, vending-machine snacks, and processed foods are so easy to find. And with your busy lifestyle, activity may be low on your list of things to do. But staying at a healthy weight may be easier than you think. Here are some dos and don'ts for staying at a healthy weight. Do eat healthy foods  The kinds of foods you eat have a big impact on both your weight and your health. Reaching and staying at a healthy weight is not about going on a diet. It's about making healthier food choices every day and changing your diet for good. Healthy eating means eating a variety of foods so that you get all the nutrients you need. Your body needs protein, carbohydrate, and fats for energy. They keep your heart beating, your brain active, and your muscles working. On most days, try to eat from each food group. This means eating a variety of:  · Whole grains, such as whole wheat breads and pastas. · Fruits and vegetables. · Dairy products, such as low-fat milk, yogurt, and cheese. · Lean proteins, such as all types of fish, chicken without the skin, and beans. Don't have too much or too little of one thing. All foods, if eaten in moderation, can be part of healthy eating. Even sweets can be okay. If your favorite foods are high in fat, salt, sugar, or calories, limit how often you eat them. Eat smaller servings, or look for healthy substitutes.   Do watch what you eat  Many people eat more than their bodies need. Part of staying at a healthy weight means learning how much food you really need from day to day and not eating more than that. Even with healthy foods, eating too much can make you gain weight. Having a well-balanced diet means that you eat enough, but not too much, and that your food gives you the nutrients you need to stay healthy. So listen to your body. Eat when you're hungry. Stop when you feel satisfied. It's a good idea to have healthy snacks ready for when you get hungry. Keep healthy snacks with you at work, in your car, and at home. If you have a healthy snack easily available, you'll be less likely to pick a candy bar or bag of chips from a vending machine instead. Some healthy snacks you might want to keep on hand are fruit, low-fat yogurt, string cheese, low-fat microwave popcorn, raisins and other dried fruit, nuts, whole wheat crackers, pretzels, carrots, celery sticks, and broccoli. Do some physical activity  A big part of reaching and staying at a healthy weight is being active. When you're active, you burn calories. This makes it easier to reach and stay at a healthy weight. When you're active on a regular basis, your body burns more calories, even when you're at rest. Being active helps you lose fat and build lean muscle. Try to be active for at least 1 hour every day. This may sound like a lot, but it's okay to be active in smaller blocks of time that add up to 1 hour a day. Any activity that makes your heart beat faster and keeps it there for a while counts. A brisk walk, run, or swim will get your heart beating faster. So will climbing stairs, shooting baskets, or cycling. Even some household chores like vacuuming and mowing the lawn will get your heart rate up. Pick activities that you enjoyones that make your heart beat faster, your muscles stronger, and your muscles and joints more flexible.  If you find more than one thing you like doing, do them all. You don't have to do the same thing every day. Don't diet  Diets don't work. Diets are temporary. Because you give up so much when you diet, you may be hungry and think about food all the time. And after you stop dieting, you also may overeat to make up for what you missed. Most people who diet end up gaining back the pounds they lostand more. Remember that healthy bodies come in lots of shapes and sizes. Everyone can get healthier by eating better and being more active. Where can you learn more? Go to https://Yee Carepepiceweb.Puuilo. org and sign in to your [employer] account. Enter 356 9213 in the [employer] box to learn more about \"Learning About Healthy Weight. \"     If you do not have an account, please click on the \"Sign Up Now\" link. Current as of: September 23, 2020               Content Version: 12.8  © 2006-2021 [employer]. Care instructions adapted under license by [facility] ([facility]). If you have questions about a medical condition or this instruction, always ask your healthcare professional. [place] [address] any warranty or liability for your use of this information. Patient Education        High-Fiber Diet: Care Instructions  Your Care Instructions     A high-fiber diet may help you relieve constipation and feel less bloated. Your doctor and dietitian will help you make a high-fiber eating plan based on your personal needs. The plan will include the things you like to eat. It will also make sure that you get 30 grams of fiber a day. Before you make changes to the way you eat, be sure to talk with your doctor or dietitian. Follow-up care is a key part of your treatment and safety. Be sure to make and go to all appointments, and call your doctor if you are having problems. It's also a good idea to know your test results and keep a list of the medicines you take. How can you care for yourself at home?   · You can increase how much fiber you get if you eat more of certain foods. These foods include:  ? Whole-grain breads and cereals. ? Fruits, such as pears, apples, and peaches. Eat the skins, peels, and seeds, if you can.  ? Vegetables, such as broccoli, cabbage, spinach, carrots, asparagus, and squash. ? Starchy vegetables. These include potatoes with skins, kidney beans, and lima beans. · Take a fiber supplement every day if your doctor recommends it. Examples are Benefiber, Citrucel, FiberCon, and Metamucil. Ask your doctor how much to take. · Drink plenty of fluids. If you have kidney, heart, or liver disease and have to limit fluids, talk with your doctor before you increase the amount of fluids you drink. · Get some exercise every day. Exercise helps stool move through the colon. It also helps prevent constipation. · Keep a food diary. Try to notice and write down what foods cause gas, pain, or other symptoms. Then you can avoid these foods. Where can you learn more? Go to https://Gray Routes Innovative Distribution.Healthify. org and sign in to your [employer] account. Enter C220 in the [facility] box to learn more about \"High-Fiber Diet: Care Instructions. \"     If you do not have an account, please click on the \"Sign Up Now\" link. Current as of: December 17, 2020               Content Version: 12.8  © 5747-8129 [employer]. Care instructions adapted under license by [facility]. If you have questions about a medical condition or this instruction, always ask your healthcare professional. [place] [address] any warranty or liability for your use of this information. Patient Education        Learning About Foods That Are Good Sources of Fiber  What foods are high in fiber? The foods you eat contain nutrients, such as vitamins and minerals. Fiber is a nutrient. Your body needs the right amount to stay healthy and work as it should.  You can use the list below to help you make choices about which foods to eat. Here are some examples of foods that are good sources of fiber. Fruits  · Apple  · Apricot  · Avocado  · Banana  · Blackberries  · Cherries  · Melon  · Pear  · Raspberries  Grains  · Amaranth  · Barley  · Bran cereal  · Farro  · Oat bran  · Oatmeal  · Quinoa  · Rice (brown or wild)  · Whole-grain bread  · Whole-grain English muffin  Protein foods  · Almonds  · Beans (black, kidney, navy, ugarte)  · Alex seeds  · Garbanzo beans  · Lentils  · Pumpkin seeds  · Split peas  · Sunflower seeds  Vegetables  · Artichoke  · Broccoli  · [place] sprouts  · Cabbage  · Carrots  · Cauliflower  · Eggplant  · Green peas  · Kale  · Pumpkin  · Sweet potato  · White potato  Work with your doctor to find out how much of this nutrient you need. Depending on your health, you may need more or less of it in your diet. Where can you learn more? Go to https://Inventables.Formisimo. org and sign in to your [employer] account. Enter F355 in the [facility] box to learn more about \"Learning About Foods That Are Good Sources of Fiber. \"     If you do not have an account, please click on the \"Sign Up Now\" link. Current as of: December 17, 2020               Content Version: 12.8  © 6208-5920 Healthwise, Incorporated. Care instructions adapted under license by [facility] ([facility]). If you have questions about a medical condition or this instruction, always ask your healthcare professional. [place] [address] any warranty or liability for your use of this information.

## 2021-04-15 LAB
A/G RATIO: 2 (ref 1.1–2.2)
ALBUMIN SERPL-MCNC: 4.8 G/DL (ref 3.4–5)
ALP BLD-CCNC: 88 U/L (ref 40–129)
ALT SERPL-CCNC: 14 U/L (ref 10–40)
AMYLASE: 44 U/L (ref 25–115)
ANION GAP SERPL CALCULATED.3IONS-SCNC: 6 MMOL/L (ref 3–16)
AST SERPL-CCNC: 16 U/L (ref 15–37)
BASOPHILS ABSOLUTE: 0 K/UL (ref 0–0.2)
BASOPHILS RELATIVE PERCENT: 0.5 %
BILIRUB SERPL-MCNC: <0.2 MG/DL (ref 0–1)
BUN BLDV-MCNC: 12 MG/DL (ref 7–20)
CALCIUM SERPL-MCNC: 9.3 MG/DL (ref 8.3–10.6)
CHLORIDE BLD-SCNC: 110 MMOL/L (ref 99–110)
CO2: 25 MMOL/L (ref 21–32)
CREAT SERPL-MCNC: 1.1 MG/DL (ref 0.9–1.3)
EOSINOPHILS ABSOLUTE: 0 K/UL (ref 0–0.6)
EOSINOPHILS RELATIVE PERCENT: 0.5 %
GFR AFRICAN AMERICAN: >60
GFR NON-AFRICAN AMERICAN: >60
GLOBULIN: 2.4 G/DL
GLUCOSE BLD-MCNC: 81 MG/DL (ref 70–99)
HCT VFR BLD CALC: 42.4 % (ref 40.5–52.5)
HEMOGLOBIN: 14.2 G/DL (ref 13.5–17.5)
LYMPHOCYTES ABSOLUTE: 2.6 K/UL (ref 1–5.1)
LYMPHOCYTES RELATIVE PERCENT: 33.8 %
MCH RBC QN AUTO: 28.3 PG (ref 26–34)
MCHC RBC AUTO-ENTMCNC: 33.6 G/DL (ref 31–36)
MCV RBC AUTO: 84.2 FL (ref 80–100)
MONOCYTES ABSOLUTE: 0.5 K/UL (ref 0–1.3)
MONOCYTES RELATIVE PERCENT: 6.3 %
NEUTROPHILS ABSOLUTE: 4.6 K/UL (ref 1.7–7.7)
NEUTROPHILS RELATIVE PERCENT: 58.9 %
PDW BLD-RTO: 13.6 % (ref 12.4–15.4)
PLATELET # BLD: 163 K/UL (ref 135–450)
PMV BLD AUTO: 10.4 FL (ref 5–10.5)
POTASSIUM SERPL-SCNC: 4.2 MMOL/L (ref 3.5–5.1)
RBC # BLD: 5.03 M/UL (ref 4.2–5.9)
SODIUM BLD-SCNC: 141 MMOL/L (ref 136–145)
TOTAL PROTEIN: 7.2 G/DL (ref 6.4–8.2)
WBC # BLD: 7.8 K/UL (ref 4–11)

## 2021-04-22 DIAGNOSIS — R19.7 DIARRHEA, UNSPECIFIED TYPE: ICD-10-CM

## 2021-04-22 LAB
C DIFF TOXIN/ANTIGEN: NORMAL
OCCULT BLOOD SCREENING: NORMAL
WHITE BLOOD CELLS (WBC), STOOL: NORMAL

## 2021-04-23 LAB
CRYPTOSPORIDIUM ANTIGEN STOOL: NORMAL
E HISTOLYTICA ANTIGEN STOOL: NORMAL
GI BACTERIAL PATHOGENS BY PCR: NORMAL
GIARDIA ANTIGEN STOOL: NORMAL

## 2021-04-27 ENCOUNTER — TELEPHONE (OUTPATIENT)
Dept: SURGERY | Age: 26
End: 2021-04-27

## 2021-04-27 ENCOUNTER — OFFICE VISIT (OUTPATIENT)
Dept: SURGERY | Age: 26
End: 2021-04-27
Payer: COMMERCIAL

## 2021-04-27 VITALS
SYSTOLIC BLOOD PRESSURE: 125 MMHG | DIASTOLIC BLOOD PRESSURE: 82 MMHG | HEIGHT: 78 IN | BODY MASS INDEX: 34.94 KG/M2 | WEIGHT: 302 LBS | HEART RATE: 102 BPM

## 2021-04-27 DIAGNOSIS — K60.2 ANAL FISSURE: Primary | ICD-10-CM

## 2021-04-27 DIAGNOSIS — Z86.59 HISTORY OF OCD (OBSESSIVE COMPULSIVE DISORDER): ICD-10-CM

## 2021-04-27 PROCEDURE — 1036F TOBACCO NON-USER: CPT | Performed by: SURGERY

## 2021-04-27 PROCEDURE — G8427 DOCREV CUR MEDS BY ELIG CLIN: HCPCS | Performed by: SURGERY

## 2021-04-27 PROCEDURE — 99203 OFFICE O/P NEW LOW 30 MIN: CPT | Performed by: SURGERY

## 2021-04-27 PROCEDURE — G8417 CALC BMI ABV UP PARAM F/U: HCPCS | Performed by: SURGERY

## 2021-04-27 NOTE — TELEPHONE ENCOUNTER
Prescription called in to [facility] for nifedipine 0.3%, lidocaine 5%. Instructed to use BID.

## 2021-04-27 NOTE — PROGRESS NOTES
[address] [address] [address] [facility]  Dept: [phone]  Dept Fax: [phone]  Loc: [phone]    Visit Date: 4/27/2021    Roselia Bosch is a 23 y.o. male who presents today for: New Patient (Grade III hemorrhoids referred by Dr. Corin Arriaza)      HPI:       Roselia Bosch is a 23 y.o. male referred by Dr. Elmer Spencer for anorectal discomfort. Patient has had 2 weeks days of anorectal pain and discomfort. Symptoms occur after BMs, after wiping. Bleeding: yes  Constipation: no  Patient has tried: OTC ointment  Previous similar symptoms: no  Previous anorectal surgeries: no    Denies fever, chills, abd pain, nausea, emesis, weight loss. Patient's problem list, medications, past medical, surgical, family, and social histories were reviewed and updated in the chart as indicated today. Past Medical History:   Diagnosis Date    ADHD (attention deficit hyperactivity disorder)     dx young child    Bipolar 1 disorder, manic, mild ([facility] [address]     dx as a child    History of OCD (obsessive compulsive disorder)     dx as a child    Tourette syndrome     dx as a child       Past Surgical History:   Procedure Laterality Date    TONSILLECTOMY         Family History: Family history of colorectal cancer/polyps: no    Social History:   Social History     Tobacco Use    Smoking status: Former Smoker     Types: Cigarettes    Smokeless tobacco: Never Used   Substance Use Topics    Alcohol use: Not Currently     Comment: 3-4 beers      Tobacco cessation counseling provided as appropriate. REVIEW OF SYSTEMS:    Pertinent positives and negatives are mentioned in the HPI above. Otherwise, all other systems were reviewed and negative.       Objective:     Physical Exam   /82 (Site: Left Upper Arm, Position: Sitting, Cuff Size: Large Adult)   Pulse 102   Ht 6' 6\" (1.981 m)   Wt (!) 302 lb (137 kg)   BMI 34.90 kg/m²   Constitutional: Appears well-developed and well-nourished. Grooming appropriate. No gross deformities. Body mass index is 34.9 kg/m². Eyes: No scleral icterus. Conjunctiva/lids normal. Vision intact grossly. Pupils equal/symmetric, reactive bilaterally. ENT: External ears/nose without defect, scars, or masses. Hearing grossly intact. No facial deformity. Lips normal, normal dentition. Neck: No masses. Trachea midline. No crepitus. Thyroid not enlarged. Cardiovascular: Normal rate. No peripheral edema. Abdominal aorta normal size to palpation. Pulmonary/Chest: Effort normal. No respiratory distress. No wheezes. No use of accessory muscles. Musculoskeletal: Normal range of motion x all 4 extremities and head/neck, without deformity, pain, or crepitus, with normal strength and tone. Normal gait. Nails without clubbing or cyanosis. Neurological: Alert and oriented to person, place, and time. No gross deficits. Sensation intact. Skin: Skin is dry. No rashes noted. No pallor. No induration of nodules. Psychiatric: Normal mood and affect. Behavior normal. Oriented to person, place, and time. Judgment and insight reasonable. Abdominal/wound: soft, obese, nontender    RECTAL EXAM:    Chaperone/MA present in room during entire exam. Patient was placed in lateral or knee-chest positioning depending on comfort. Exam table manipulated for proper visualization and lighting. Buttocks spread.      Inspection reveals: Anterior midline fissure confirmed by cotton tip swab palpation    Also with small tear in his perinuem    Digital exam and anoscopy deferred due to acute pain    Labs: none  Radiology: none    Last colonoscopy: none      Assessment/Plan:     A/P:  New problem(s): Anal fissure  Established problem(s): schizoaffective d/o  Additional workup/treatment planned: Medical therapy with prescription calcium channel blocker ointment, fiber supplementation, sitz baths, improving dietary and lifestyle choices  Risk of complications/morbidity: moderate    Patient has signs and symptoms consistent with anal fissure. Exam reveals anterior midline acute anal fissure. We will start with conservative management, including fiber supplementation, water, healthy fruits and vegetables, and medical management with prescription topical calcium channel blocker ointment. Discussed the use of the prescription ointment and that it will need to be obtained from a compounding pharmacy, which my office will arrange. If symptoms do not improve in 6-8 weeks, patient may require more invasive treatment options, such as Botox injection, partial sphincterotomy, or fissurectomy with anocutaneous advancement flap. We briefly discussed operative risks of these various options. Patient understands the need for full anorectal exam in the future after resolution of symptoms (or colonoscopy depending on other risk factors for colon cancer). I provided written information in the After Visit Summary AVS Regarding: Anal fissure    DISPOSITION:  F/u in 6 weeks for symptom reassessment    My findings will be relayed to consulting practitioner or PCP via Epic    Note completed using dictation software, please excuse any errors.     Electronically signed by oJhana Levi MD on 4/27/2021 at 2:41 PM

## 2021-04-27 NOTE — PATIENT INSTRUCTIONS
Anal Fissure: Information and Care Instructions        An anal fissure is a tear in the lining of the anus. It typically causes intense, sharp pain and a small amount of bleeding. You may notice bright red blood on the toilet paper after you wipe. A fissure may form if you are constipated and try to pass a large, hard stool, or have excessive diarrhea. Some fissures form despite regular, soft stools. Some are due to trauma. Rarely, fissures can be a sign of other diseases such as Crohn's disease, infections, or cancer. In 50% of patients, anal fissure will heal by addressing the underlying problem and avoiding additional hard stool and constipation. See below for recommendations. In the other 50% of patients, anal fissures are resistant to healing due to spasm (abnormal tightening) of the internal sphincter muscle. This part of the anal muscles is under automatic control, so you may not feel the spasm. Most treatments attempt to break the cycle of spasm and pain by relaxing the internal sphincter muscle. This can be done with medication, Botox injection, and occasionally with surgery. Anal fissures themselves do not lead to cancer or other serious illnesses, however undiagnosed rectal bleeding can be a sign of other problems in the colon and rectum, such as hemorrhoids, infections, polyps, or even cancers. If bleeding is excessive, mixed with stool, or ongoing after healing of the fissure, or you have a family history of cancer, colonoscopy may be recommended. How to treat constipation and avoid straining:  · Avoid constipation:  ¨ Include fruits, vegetables, beans, and whole grains in your diet each day. These foods are high in fiber. ¨ Take a fiber supplement, such as Benefiber, Citrucel, or Metamucil, every day. Read and follow all instructions on the label. ¨ Drink plenty of fluids, enough so that your urine is light yellow or clear like water.  If you have kidney, heart, or liver disease and have to limit fluids, talk with your doctor before you increase the amount of fluids you drink. ¨ Miralax or colace can be helpful to take as needed for constipation. Read and follow all instructions on the label. ¨ Use the toilet when only when you feel the urge. Do not strain when having a bowel movement. Do not sit on the toilet too long, play games on your cell phone, or read while on the commode. ¨ Get some exercise every day. Build up slowly to 30 to 60 minutes a day on 5 or more days of the week. · Support your feet with a small step stool when you sit on the toilet. This helps flex your hips and places your pelvis in a squatting position. · Most over-the-counter ointments and creams are not helpful, and can even cause damage to the skin  · Use baby wipes instead of toilet paper to clean after a bowel movement. These products do not irritate the anus. · Sit in a few inches of warm water (sitz bath) 3 times a day and after bowel movements. The warm water helps ease discomfort. Do not put soaps, salts, or shampoos in the water. Be sure to follow up in the office as instructed  · Typically you will have a follow up appointment scheduled in 4-6 weeks to reassess your symptoms. If not, please call the office to schedule this. · You may need to be seen sooner if you have fever, chills, excessive bleeding, increasing pain, inability to urinate, or changes in appetite. · Please call the office at [phone] with any questions or concerns  · If it is outside of normal hours and you have any concerns, please go to your nearest emergency room. What other options are available to treat fissures if I continue to have symptoms:  · Special ointments can be prescribed to help relax the muscle spasm. Typically, these need to be compounded (mixed) at a special pharmacy. A pea-sized amount should be used around (not inside) the anus twice daily.   · Botox injections of the sphincter can be performed, which will provide spasm relief for 1-2 months. Occasionally this needs to be repeated. This is typically performed as a same day surgery with anesthesia/sedation. · Internal sphincterotomy is a surgery in which a portion of the internal sphincter muscle is cut, to relieve the spasm. This procedure has a high success rate, but a higher risk of complications, including rarely a loss of bowel control (incontinence). This is typically performed as a same day surgery with anesthesia/sedation. · Fissurectomy and dermal advancement flap is a surgery in which healthy skin flaps are brought in from around the anus to cover the fissure and promote healing. This surgery is a bit more complex and sometimes require an overnight stay in the hospital.  · The decision of which treatment is right for you depends on the chronicity and severity of your symptoms, age, gender, previous anorectal and other surgeries, underlying bowel control issues, and any suspicion for cancer or other diseases. · Colonoscopy may be recommended at some point in your care if you have not had one recently or bleeding continues after the fissure heals    · Please talk to your colorectal surgeon about any health conditions or concerns you may have regarding your anal fissure treatment.

## 2021-05-17 ENCOUNTER — OFFICE VISIT (OUTPATIENT)
Dept: PRIMARY CARE CLINIC | Age: 26
End: 2021-05-17
Payer: COMMERCIAL

## 2021-05-17 VITALS
SYSTOLIC BLOOD PRESSURE: 110 MMHG | BODY MASS INDEX: 35.27 KG/M2 | WEIGHT: 305.2 LBS | HEART RATE: 88 BPM | DIASTOLIC BLOOD PRESSURE: 74 MMHG | TEMPERATURE: 97.8 F | OXYGEN SATURATION: 98 %

## 2021-05-17 DIAGNOSIS — Z11.1 VISIT FOR MANTOUX TEST: Primary | ICD-10-CM

## 2021-05-17 PROCEDURE — G8427 DOCREV CUR MEDS BY ELIG CLIN: HCPCS | Performed by: NURSE PRACTITIONER

## 2021-05-17 PROCEDURE — G8417 CALC BMI ABV UP PARAM F/U: HCPCS | Performed by: NURSE PRACTITIONER

## 2021-05-17 PROCEDURE — 86580 TB INTRADERMAL TEST: CPT | Performed by: NURSE PRACTITIONER

## 2021-05-17 PROCEDURE — 1036F TOBACCO NON-USER: CPT | Performed by: NURSE PRACTITIONER

## 2021-05-17 PROCEDURE — 99213 OFFICE O/P EST LOW 20 MIN: CPT | Performed by: NURSE PRACTITIONER

## 2021-05-17 SDOH — ECONOMIC STABILITY: FOOD INSECURITY: WITHIN THE PAST 12 MONTHS, YOU WORRIED THAT YOUR FOOD WOULD RUN OUT BEFORE YOU GOT MONEY TO BUY MORE.: NEVER TRUE

## 2021-05-17 ASSESSMENT — SOCIAL DETERMINANTS OF HEALTH (SDOH): HOW HARD IS IT FOR YOU TO PAY FOR THE VERY BASICS LIKE FOOD, HOUSING, MEDICAL CARE, AND HEATING?: NOT HARD AT ALL

## 2021-05-17 NOTE — PROGRESS NOTES
.  Exercise: -Walks to work daily  Seatbelt use: n/a  Tetanus: UTD  Lipid panel:    Lab Results   Component Value Date    TRIG 82 05/28/2010    HDL 38 03/02/2021    HDL 49 05/28/2010    LDLCALC 146 03/02/2021     Living will: unknown    Immunization History   Administered Date(s) Administered    Tdap (Boostrix, Adacel) 06/04/2018       Allergies   Allergen Reactions    Amoxicillin Hives     Current Outpatient Medications   Medication Sig Dispense Refill    hydrocortisone (ANUSOL-HC) 2.5 % CREA rectal cream Place rectally 2 times daily 60 g 1    hydrocortisone 2.5 % cream To the groin twice a day as needed for chafing 453.6 g 1    vitamin D3 (CHOLECALCIFEROL) 25 MCG (1000 UT) TABS tablet Take 1 tablet by mouth daily 30 tablet 5    INVEGA TRINZA 546 MG/1.75ML RONNIE IM injection        No current facility-administered medications for this visit. Past Medical History:   Diagnosis Date    ADHD (attention deficit hyperactivity disorder)     dx young child    Bipolar 1 disorder, manic, mild (HCC)     dx as a child    History of OCD (obsessive compulsive disorder)     dx as a child    Tourette syndrome     dx as a child     Past Surgical History:   Procedure Laterality Date    TONSILLECTOMY       Family History   Problem Relation Age of Onset    Mental Illness Paternal Grandfather     Depression Mother     Anxiety Disorder Mother      Social History     Socioeconomic History    Marital status: Single     Spouse name: Not on file    Number of children: 0    Years of education: 15    Highest education level: Not on file   Occupational History    Occupation: unemployed   Tobacco Use    Smoking status: Former Smoker     Types: Cigarettes    Smokeless tobacco: Never Used   Substance and Sexual Activity    Alcohol use: Not Currently     Comment: 3-4 beers    Drug use: Not Currently     Types: Marijuana, Methamphetamines     Comment: 8 months ago.      Sexual activity: Never   Other Topics Concern    Not on file   Social History Narrative    Not on file     Social Determinants of Health     Financial Resource Strain: Low Risk     Difficulty of Paying Living Expenses: Not hard at all   Food Insecurity: No Food Insecurity    Worried About Running Out of Food in the Last Year: Never true    [address] in the Last Year: Never true   Transportation Needs:     Lack of Transportation (Medical):  Lack of Transportation (Non-Medical):    Physical Activity:     Days of Exercise per Week:     Minutes of Exercise per Session:    Stress:     Feeling of Stress :    Social Connections:     Frequency of Communication with Friends and Family:     Frequency of Social Gatherings with Friends and Family:     Attends Dru[employer] Services:     Active Member of Clubs or Organizations:     Attends Club or Organization Meetings:     Marital Status:    Intimate Partner Violence:     Fear of Current or Ex-Partner:     Emotionally Abused:     Physically Abused:     Sexually Abused:        Review of Systems:  A comprehensive review of systems was negative except for what was noted in the HPI. Physical Exam:   Vitals:    05/17/21 1340   BP: 110/74   Pulse: 88   Temp: 97.8 °F (36.6 °C)   TempSrc: Temporal   SpO2: 98%   Weight: (!) 305 lb 3.2 oz (138.4 kg)     Body mass index is 35.27 kg/m². Constitutional: He is oriented to person, place, and time. He appears well-developed and well-nourished. No distress. HEENT:   Head: Normocephalic and atraumatic. Right Ear: Tympanic membrane, external ear and ear canal normal.   Left Ear: Tympanic membrane, external ear and ear canal normal.   Nose: Nose normal.   Mouth/Throat: Oropharynx is clear and moist and mucous membranes are normal. No oropharyngeal exudate or posterior oropharyngeal erythema. He has no cervical adenopathy. Eyes: Conjunctivae and extraocular motions are normal. Pupils are equal, round, and reactive to light.    Neck: Full passive range of motion exercising within your personal limits. Patient was advised to keep future appointments with their respective specialty care team(s). Questions and concerns addressed, care plan reviewed and patient is agreeable with the care plan following today's visit.     --LIBBY King - CNP on 5/17/2021 at 7:23 AM

## 2021-05-21 ENCOUNTER — TELEPHONE (OUTPATIENT)
Dept: PRIMARY CARE CLINIC | Age: 26
End: 2021-05-21

## 2021-06-02 ENCOUNTER — IMMUNIZATION (OUTPATIENT)
Dept: PRIMARY CARE CLINIC | Age: 26
End: 2021-06-02
Payer: COMMERCIAL

## 2021-06-02 PROCEDURE — 91300 COVID-19, PFIZER VACCINE 30MCG/0.3ML DOSE: CPT | Performed by: FAMILY MEDICINE

## 2021-06-02 PROCEDURE — 0001A COVID-19, PFIZER VACCINE 30MCG/0.3ML DOSE: CPT | Performed by: FAMILY MEDICINE

## 2021-06-23 ENCOUNTER — IMMUNIZATION (OUTPATIENT)
Dept: PRIMARY CARE CLINIC | Age: 26
End: 2021-06-23
Payer: COMMERCIAL

## 2021-06-23 ENCOUNTER — OFFICE VISIT (OUTPATIENT)
Dept: SURGERY | Age: 26
End: 2021-06-23
Payer: COMMERCIAL

## 2021-06-23 VITALS
DIASTOLIC BLOOD PRESSURE: 85 MMHG | BODY MASS INDEX: 34.02 KG/M2 | HEIGHT: 78 IN | SYSTOLIC BLOOD PRESSURE: 124 MMHG | WEIGHT: 294 LBS | HEART RATE: 94 BPM

## 2021-06-23 DIAGNOSIS — K60.2 ANAL FISSURE: Primary | ICD-10-CM

## 2021-06-23 PROCEDURE — 91300 COVID-19, PFIZER VACCINE 30MCG/0.3ML DOSE: CPT | Performed by: FAMILY MEDICINE

## 2021-06-23 PROCEDURE — G8417 CALC BMI ABV UP PARAM F/U: HCPCS | Performed by: SURGERY

## 2021-06-23 PROCEDURE — 1036F TOBACCO NON-USER: CPT | Performed by: SURGERY

## 2021-06-23 PROCEDURE — 0002A COVID-19, PFIZER VACCINE 30MCG/0.3ML DOSE: CPT | Performed by: FAMILY MEDICINE

## 2021-06-23 PROCEDURE — 99213 OFFICE O/P EST LOW 20 MIN: CPT | Performed by: SURGERY

## 2021-06-23 PROCEDURE — G8427 DOCREV CUR MEDS BY ELIG CLIN: HCPCS | Performed by: SURGERY

## 2021-06-23 NOTE — PROGRESS NOTES
[address] [address] [address]  Dept: [phone]  Dept Fax: [phone]  Loc: [phone]    Visit Date: 6/23/2021    Pacheco Lovett is a 27 y.o. male who presents today for: Follow-up (6 week fissure f/u)      HPI:       Pacheco Lovett is a 27 y.o. male known to me after diagnosis of anal fissure about 2 months ago. He is been using calcium channel blocker prescription ointment and doing well. He has no more pain and no more bleeding. Overall feels much better.     Past Medical History:   Diagnosis Date    ADHD (attention deficit hyperactivity disorder)     dx young child    Bipolar 1 disorder, manic, mild ([facility] [address]     dx as a child    History of OCD (obsessive compulsive disorder)     dx as a child    Tourette syndrome     dx as a child     Past Surgical History:   Procedure Laterality Date    TONSILLECTOMY         Current Outpatient Medications:     sertraline (ZOLOFT) 50 MG tablet, , Disp: , Rfl:     hydrocortisone (ANUSOL-HC) 2.5 % CREA rectal cream, Place rectally 2 times daily, Disp: 60 g, Rfl: 1    hydrocortisone 2.5 % cream, To the groin twice a day as needed for chafing, Disp: 453.6 g, Rfl: 1    vitamin D3 (CHOLECALCIFEROL) 25 MCG (1000 UT) TABS tablet, Take 1 tablet by mouth daily, Disp: 30 tablet, Rfl: 5    INVEGA TRINZA 546 MG/1.75ML RONNIE IM injection, , Disp: , Rfl:   Allergies   Allergen Reactions    Amoxicillin Hives     Past Surgical History:   Procedure Laterality Date    TONSILLECTOMY       Family History   Problem Relation Age of Onset    Mental Illness Paternal Grandfather     Depression Mother     Anxiety Disorder Mother        Social History:   Social History     Tobacco Use    Smoking status: Former Smoker     Types: Cigarettes    Smokeless tobacco: Never Used   Substance Use Topics    Alcohol use: Not Currently     Comment: 3-4 roxanne      Tobacco cessation counseling provided as next couple weeks  Risk of complications/morbidity: Moderate    Overall the fissure appears to be improving and he is feeling much better. Discussed continuing prescription calcium channel blocker ointment and starting to wean himself off for the next couple weeks. After that needs to continue to keep his stool soft without straining. Discussed starting colonoscopy at age 41. Continue with current prescription medications    DISPOSITION:  F/u with me PRN    My findings will be relayed to consulting practitioner or PCP via Epic note    Note completed using dictation software, please excuse any errors.     Electronically signed by Cristy Harmon MD on 6/23/2021 at 1:47 PM

## 2021-08-14 ENCOUNTER — HOSPITAL ENCOUNTER (EMERGENCY)
Age: 26
Discharge: HOME | End: 2021-08-14
Attending: EMERGENCY MEDICINE
Payer: MEDICARE

## 2021-08-14 VITALS
WEIGHT: 312.61 LBS | SYSTOLIC BLOOD PRESSURE: 135 MMHG | TEMPERATURE: 98.1 F | HEIGHT: 77 IN | DIASTOLIC BLOOD PRESSURE: 88 MMHG | BODY MASS INDEX: 36.91 KG/M2 | HEART RATE: 80 BPM | RESPIRATION RATE: 14 BRPM | OXYGEN SATURATION: 99 %

## 2021-08-14 DIAGNOSIS — B00.89 HERPETIC WHITLOW: ICD-10-CM

## 2021-08-14 DIAGNOSIS — B00.2 ORAL HERPES: Primary | ICD-10-CM

## 2021-08-14 DIAGNOSIS — L01.00 IMPETIGO: ICD-10-CM

## 2021-08-14 PROCEDURE — 6370000000 HC RX 637 (ALT 250 FOR IP): Performed by: GENERAL ACUTE CARE HOSPITAL

## 2021-08-14 PROCEDURE — 99284 EMERGENCY DEPT VISIT MOD MDM: CPT

## 2021-08-14 RX ORDER — ACYCLOVIR 200 MG/1
400 CAPSULE ORAL 3 TIMES DAILY
Qty: 60 CAPSULE | Refills: 0 | Status: SHIPPED | OUTPATIENT
Start: 2021-08-14 | End: 2021-08-24

## 2021-08-14 RX ORDER — PREDNISONE 20 MG/1
60 TABLET ORAL ONCE
Status: COMPLETED | OUTPATIENT
Start: 2021-08-14 | End: 2021-08-14

## 2021-08-14 RX ORDER — HYDROCODONE BITARTRATE AND ACETAMINOPHEN 5; 325 MG/1; MG/1
1 TABLET ORAL ONCE
Status: COMPLETED | OUTPATIENT
Start: 2021-08-14 | End: 2021-08-14

## 2021-08-14 RX ORDER — PREDNISONE 50 MG/1
50 TABLET ORAL DAILY
Qty: 5 TABLET | Refills: 0 | Status: SHIPPED | OUTPATIENT
Start: 2021-08-14 | End: 2021-08-19

## 2021-08-14 RX ORDER — CEPHALEXIN 500 MG/1
500 CAPSULE ORAL 4 TIMES DAILY
Qty: 28 CAPSULE | Refills: 0 | Status: SHIPPED | OUTPATIENT
Start: 2021-08-14 | End: 2021-08-21

## 2021-08-14 RX ORDER — ACYCLOVIR 200 MG/1
400 CAPSULE ORAL ONCE
Status: COMPLETED | OUTPATIENT
Start: 2021-08-14 | End: 2021-08-14

## 2021-08-14 RX ADMIN — ACYCLOVIR 400 MG: 200 CAPSULE ORAL at 16:45

## 2021-08-14 RX ADMIN — HYDROCODONE BITARTRATE AND ACETAMINOPHEN 1 TABLET: 5; 325 TABLET ORAL at 16:18

## 2021-08-14 RX ADMIN — PREDNISONE 60 MG: 20 TABLET ORAL at 16:19

## 2021-08-14 ASSESSMENT — ENCOUNTER SYMPTOMS
WHEEZING: 0
SHORTNESS OF BREATH: 0
VOMITING: 0
SORE THROAT: 0
ABDOMINAL PAIN: 0
VOICE CHANGE: 0
NAUSEA: 0
TROUBLE SWALLOWING: 0
BACK PAIN: 0
EYE PAIN: 0
CHEST TIGHTNESS: 0

## 2021-08-14 ASSESSMENT — PAIN SCALES - GENERAL
PAINLEVEL_OUTOF10: 7
PAINLEVEL_OUTOF10: 0

## 2021-08-14 NOTE — ED PROVIDER NOTES
[address] [facility]        Pt Name: Mika May  MRN: [id number]  [place] 1995  Date of evaluation: 8/14/2021  Provider: LIBBY Johnson - CNP  PCP: LIBBY Dailey  Note Started: 3:56 PM EDT        I have seen and evaluated this patient with my supervising physician No att. providers found. CHIEF COMPLAINT       Chief Complaint   Patient presents with    Mouth Lesions     patient states woke up this morning with mouth/lip lesions. also blisters and open ulcerations on both hands fingers and thumb. HISTORY OF PRESENT ILLNESS   (Location, Timing/Onset, Context/Setting, Quality, Duration, Modifying Factors, Severity, Associated Signs and Symptoms)  Note limiting factors. Chief Complaint: Oral and hand lesions    Mika May is a 27 y.o. male who presents to the emergency department today for evaluation of oral and hand lesions. He states that he woke up with the symptoms this morning. He denies recent travel or known sick contacts. He is otherwise healthy without any chronic medical conditions. He states that he is not sexually active nor has he ever been. He denies contact with any known allergens. He does not work. He states that the lesions are very painful. He reports a pain level of 6 out of 10. He describes pain as constant dull and burning. The pain does not radiate. He has not taken anything for symptoms. He denies recent fever, chills, or other symptoms. Nursing Notes were all reviewed and agreed with or any disagreements were addressed in the HPI. REVIEW OF SYSTEMS    (2-9 systems for level 4, 10 or more for level 5)     Review of Systems   Constitutional: Negative for chills and fever. HENT: Negative for congestion, dental problem, drooling, sore throat, trouble swallowing and voice change. Eyes: Negative for pain and visual disturbance.    Respiratory: Negative for chest tightness, Mental Illness Paternal Grandfather     Depression Mother     Anxiety Disorder Mother           SOCIAL HISTORY       Social History     Tobacco Use    Smoking status: Former Smoker     Types: Cigarettes    Smokeless tobacco: Never Used   Vaping Use    Vaping Use: Never used   Substance Use Topics    Alcohol use: Not Currently     Comment: 3-4 beers    Drug use: Not Currently     Types: Marijuana, Methamphetamines     Comment: 8 months ago. SCREENINGS             PHYSICAL EXAM    (up to 7 for level 4, 8 or more for level 5)     ED Triage Vitals [08/14/21 1303]   BP Temp Temp Source Pulse Resp SpO2 Height Weight   135/88 98.4 °F (36.9 °C) Temporal 89 16 98 % 6' 5\" (1.956 m) (!) 312 lb 9.8 oz (141.8 kg)       Physical Exam  Constitutional:       General: He is not in acute distress. Appearance: He is not ill-appearing, toxic-appearing or diaphoretic. Comments: BMI is 37   HENT:      Head: Normocephalic and atraumatic. Right Ear: Tympanic membrane, ear canal and external ear normal.      Left Ear: Tympanic membrane, ear canal and external ear normal.      Nose: Nose normal.      Mouth/Throat:      Lips: Lesions present. Mouth: Mucous membranes are moist. No oral lesions or angioedema. Pharynx: Oropharyngeal exudate and posterior oropharyngeal erythema present. No pharyngeal swelling or uvula swelling. Comments: There are no intraoral lesions. Vesicular honey crusted lesions noted to upper and lower lips. Lips are edematous. No evidence of airway compromise. See below pictures for full details. Eyes:      Extraocular Movements: Extraocular movements intact. Conjunctiva/sclera: Conjunctivae normal.   Cardiovascular:      Rate and Rhythm: Normal rate and regular rhythm. Pulses: Normal pulses. Heart sounds: Normal heart sounds. Pulmonary:      Effort: Pulmonary effort is normal.      Breath sounds: Normal breath sounds.    Abdominal:      Palpations: Abdomen is soft.      Tenderness: There is no abdominal tenderness. Musculoskeletal:      Cervical back: Normal range of motion. Skin:     General: Skin is warm and dry. Capillary Refill: Capillary refill takes less than 2 seconds. Findings: Lesion and rash present. Neurological:      General: No focal deficit present. Mental Status: He is oriented to person, place, and time. Psychiatric:         Mood and Affect: Mood normal.         Behavior: Behavior normal.         Thought Content: Thought content normal.         Judgment: Judgment normal.                         DIAGNOSTIC RESULTS   LABS:    Labs Reviewed - No data to display    When ordered only abnormal lab results are displayed. All other labs were within normal range or not returned as of this dictation. EKG: When ordered, EKG's are interpreted by the Emergency Department Physician in the absence of a cardiologist.  Please see their note for interpretation of EKG. RADIOLOGY:   Non-plain film images such as CT, Ultrasound and MRI are read by the radiologist. Plain radiographic images are visualized and preliminarily interpreted by the ED Provider with the below findings:        Interpretation per the Radiologist below, if available at the time of this note:    No orders to display     No results found.         PROCEDURES   Unless otherwise noted below, none     Procedures    CRITICAL CARE TIME   N/A    CONSULTS:  None      EMERGENCY DEPARTMENT COURSE and DIFFERENTIAL DIAGNOSIS/MDM:   Vitals:    Vitals:    08/14/21 1303 08/14/21 1656   BP: 135/88    Pulse: 89 80   Resp: 16 14   Temp: 98.4 °F (36.9 °C) 98.1 °F (36.7 °C)   TempSrc: Temporal Oral   SpO2: 98% 99%   Weight: (!) 312 lb 9.8 oz (141.8 kg)    Height: 6' 5\" (1.956 m)        Patient was given the following medications:  Medications   predniSONE (DELTASONE) tablet 60 mg (60 mg Oral Given 8/14/21 1619)   acyclovir (ZOVIRAX) capsule 400 mg (400 mg Oral Given 8/14/21 1645) HYDROcodone-acetaminophen (NORCO) 5-325 MG per tablet 1 tablet (1 tablet Oral Given 8/14/21 1618)         Previous records reviewed in order to gain further information regarding patient's PMH as well as his HPI. Nursing notes reviewed. This is a 77-year-old  male who presents to the emergency department today reporting lesions to his lips and to the fingers as well. He states that he woke up with the symptoms this morning. He has never had anything like this in the past.  Physical exam complete. Patient is nontoxic, afebrile, normotensive. No signs or symptoms of acute distress noted however patient does appear uncomfortable. Complains are most consistent with oral herpes and herpetic giovana. This case is discussed with ED attending Dr. Telly Marin who also perform face-to-face evaluation and agrees that patient may be safely discharged with emphasis on close outpatient follow-up. Patient is given prescriptions for prednisone, acyclovir, and Keflex for likely developing impetigo. He agrees to take the prescribed medications just as directed. He agrees to follow-up with his PCP within 3 days for reevaluation. He is counseled on the importance of good hygiene. He is aware that this virus is highly transmissible. He agrees return for high fever, incessant vomiting, severe pain, any other worsening symptoms. He is discharged home with his mother in stable condition. FINAL IMPRESSION      1. Oral herpes    2. Herpetic giovana    3.  Impetigo          DISPOSITION/PLAN   DISPOSITION Decision To Discharge 08/14/2021 03:57:37 PM      PATIENT REFERRED TO:  [place] [address]  [phone]    In 3 days        DISCHARGE MEDICATIONS:  Discharge Medication List as of 8/14/2021  4:47 PM      START taking these medications    Details   acyclovir (ZOVIRAX) 200 MG capsule Take 2 capsules by mouth 3 times daily for 10 days, Disp-60 capsule, R-0Print      predniSONE

## 2021-08-14 NOTE — ED PROVIDER NOTES
I have personally performed a face to face diagnostic evaluation on this patient. I have fully participated in the care of this patient. I have reviewed and agree with all pertinent clinical information including history, physical exam, diagnostic tests, and the plan. HPI: Jacob Lee presented with mouth lesions which began he states last night. Present on his lips as well as bilateral thumbs and 4 fingers. Painful vesicles now oozing and crusting around his mouth and fingers. Patient is not sexually active has not had any recent partners with kissing or sharing cups no history of herpes that he is aware of. Chief Complaint   Patient presents with    Mouth Lesions     patient states woke up this morning with mouth/lip lesions. also blisters and open ulcerations on both hands fingers and thumb. Review of Systems: See DILLAN note  Vital Signs: /88   Pulse 89   Temp 98.4 °F (36.9 °C) (Temporal)   Resp 16   Ht 6' 5\" (1.956 m)   Wt (!) 312 lb 9.8 oz (141.8 kg)   SpO2 98%   BMI 37.07 kg/m²     Alert 27 y.o. male who does not appear toxic or acutely ill  HENT: Atraumatic, oral mucosa moist.  No lesions within oral mucosa on gums tongue or back of throat. Lips upper and lower with significant vesicles with open drainage with some necrotic tissue  Neck: Grossly normal ROM  Chest/Lungs: respiratory effort normal   Extremities: Lateral hands with vesicles on first fingers and thumb  Musculoskeletal: Grossly normal ROM  Skin: No palor or diaphoresis    Medical Decision Making and Plan:  Pertinent Labs & Imaging studies reviewed. (See DILLAN chart for details)  I agree with assessment and plan. Herpetic lesions on mouth and hands will treat with antiviral, steroids, Keflex for mild impetigo. See DILLAN note for further details. I placed media inside patient's chart.   Follow-up with PCP       Radha Hughes MD  08/14/21 5132

## 2021-09-13 ENCOUNTER — OFFICE VISIT (OUTPATIENT)
Dept: PRIMARY CARE CLINIC | Age: 26
End: 2021-09-13
Payer: MEDICARE

## 2021-09-13 VITALS
DIASTOLIC BLOOD PRESSURE: 83 MMHG | BODY MASS INDEX: 35.98 KG/M2 | HEIGHT: 78 IN | SYSTOLIC BLOOD PRESSURE: 121 MMHG | TEMPERATURE: 97.9 F | WEIGHT: 311 LBS | HEART RATE: 85 BPM

## 2021-09-13 DIAGNOSIS — Z23 NEEDS FLU SHOT: ICD-10-CM

## 2021-09-13 DIAGNOSIS — B00.2 ORAL HERPES: ICD-10-CM

## 2021-09-13 DIAGNOSIS — L01.00 IMPETIGO: Primary | ICD-10-CM

## 2021-09-13 DIAGNOSIS — B00.89 HERPETIC WHITLOW: ICD-10-CM

## 2021-09-13 PROCEDURE — G0008 ADMIN INFLUENZA VIRUS VAC: HCPCS | Performed by: NURSE PRACTITIONER

## 2021-09-13 PROCEDURE — G8427 DOCREV CUR MEDS BY ELIG CLIN: HCPCS | Performed by: NURSE PRACTITIONER

## 2021-09-13 PROCEDURE — 1111F DSCHRG MED/CURRENT MED MERGE: CPT | Performed by: NURSE PRACTITIONER

## 2021-09-13 PROCEDURE — 1036F TOBACCO NON-USER: CPT | Performed by: NURSE PRACTITIONER

## 2021-09-13 PROCEDURE — G8417 CALC BMI ABV UP PARAM F/U: HCPCS | Performed by: NURSE PRACTITIONER

## 2021-09-13 PROCEDURE — 90674 CCIIV4 VAC NO PRSV 0.5 ML IM: CPT | Performed by: NURSE PRACTITIONER

## 2021-09-13 PROCEDURE — 99214 OFFICE O/P EST MOD 30 MIN: CPT | Performed by: NURSE PRACTITIONER

## 2021-09-13 RX ORDER — ACYCLOVIR 200 MG/1
400 CAPSULE ORAL 3 TIMES DAILY
Qty: 42 CAPSULE | Refills: 0 | Status: SHIPPED | OUTPATIENT
Start: 2021-09-13 | End: 2021-09-20

## 2021-09-13 RX ORDER — CEPHALEXIN 500 MG/1
500 CAPSULE ORAL 4 TIMES DAILY
Qty: 28 CAPSULE | Refills: 0 | Status: SHIPPED | OUTPATIENT
Start: 2021-09-13 | End: 2021-09-20

## 2021-09-13 ASSESSMENT — ENCOUNTER SYMPTOMS
SHORTNESS OF BREATH: 0
TROUBLE SWALLOWING: 0
COUGH: 0

## 2021-09-13 NOTE — PROGRESS NOTES
Date: 9/13/2021                                               Subjective/Objective:     Chief Complaint   Patient presents with    Follow-Up from Hospital     herpes/hand foot mouth       HPI     Josiah Diaz is a 28 yo male, here for ER follow up. Here with Quita Kelley, . Lives in a group home, needs clearance to return to group activities. He was evaluated 8/14/2021 in ER for oral and hand lesions. Symptom onset was same day. Lesions were described as painful. He was started on acyclovir for oral herpes, herpetic giovana. Given prednisone burst. Was also started on keflex for suspected impetigo. He tells me he finished the prednisone. Took half of the of the acyclovir and keflex. Reports lesions to hands and mouth are improving. Not painful. No drainage. Denies fevers/chills.          Patient Active Problem List    Diagnosis Date Noted    Diarrhea 04/14/2021    Grade III hemorrhoids 04/14/2021    Laceration of right hand without foreign body 06/07/2018    Schizophreniform disorder with good prognostic features ([address] 11/10/2016    Psychological trauma 11/10/2016    Tourette's syndrome 11/10/2016    Impaired social interaction 11/10/2016    Suicidal ideation 11/10/2016    History of OCD (obsessive compulsive disorder)        Past Medical History:   Diagnosis Date    ADHD (attention deficit hyperactivity disorder)     dx young child    Bipolar 1 disorder, manic, mild ([address]     dx as a child    History of OCD (obsessive compulsive disorder)     dx as a child    Tourette syndrome     dx as a child       Past Surgical History:   Procedure Laterality Date    TONSILLECTOMY         Orders Only on 04/22/2021   Component Date Value Ref Range Status    C.diff Toxin/Antigen 04/22/2021    Final                    Value:Negative for Clostridium difficile antigen and toxin  Normal Range: Negative      White Blood Cells (WBC), Stool 04/22/2021    Final Value:Negative  Normal Range: Negative      Cryptosporidium Ag 04/22/2021    Final                    Value:Negative  Normal Range: Negative  This stool specimen has been tested for the above parasites  by enzyme immunoassay. A preserved specimen is held for one  week after the results are reported. If clinically indicated,  a comprehensive microscopic examination can be performed by  calling the Microbiology Lab.  E Histolytica Ag 04/22/2021    Final                    Value:Negative  Normal Range: Negative  This stool specimen has been tested for the above parasites  by enzyme immunoassay. A preserved specimen is held for one  week after the results are reported. If clinically indicated,  a comprehensive microscopic examination can be performed by  calling the Microbiology Lab.  Giardia Ag, Stl 04/22/2021    Final                    Value:Negative  Normal Range: Negative  This stool specimen has been tested for the above parasites  by enzyme immunoassay. A preserved specimen is held for one  week after the results are reported. If clinically indicated,  a comprehensive microscopic examination can be performed by  calling the Microbiology Lab.  Occult Blood Screening 04/22/2021    Final                    Value:Result: Negative  Normal range: Negative      GI Bacterial Pathogens By PCR 04/22/2021    Final                    Value:No Shigella spp/EIEC DNA detected  No Shiga toxin-producing gene(s) detected  No Campylobacter spp. (jejuni and coli)DNA detected  No Salmonella spp.  DNA detected  Normal Range:  None detected         Family History   Problem Relation Age of Onset    Mental Illness Paternal Grandfather     Depression Mother     Anxiety Disorder Mother        Current Outpatient Medications   Medication Sig Dispense Refill    acyclovir (ZOVIRAX) 200 MG capsule Take 2 capsules by mouth 3 times daily for 7 days 42 capsule 0    cephALEXin (KEFLEX) 500 MG capsule Take 1 capsule by mouth 4 times daily for 7 days 28 capsule 0    sertraline (ZOLOFT) 50 MG tablet       INVEGA TRINZA 546 MG/1.75ML RONNIE IM injection       vitamin D3 (CHOLECALCIFEROL) 25 MCG (1000 UT) TABS tablet Take 1 tablet by mouth daily (Patient not taking: Reported on 9/13/2021) 30 tablet 5     No current facility-administered medications for this visit. Allergies   Allergen Reactions    Amoxicillin Hives       Review of Systems   Constitutional: Negative for chills and fever. HENT: Positive for mouth sores. Negative for trouble swallowing. Respiratory: Negative for cough and shortness of breath. Skin: Positive for rash and wound. Lesions to bilat thumbs, lips   Psychiatric/Behavioral: Negative for dysphoric mood. Vitals:  /83   Pulse 85   Temp 97.9 °F (36.6 °C) (Temporal)   Ht 6' 6\" (1.981 m)   Wt (!) 311 lb (141.1 kg)   BMI 35.94 kg/m²     Physical Exam  Vitals reviewed. Constitutional:       Appearance: Normal appearance. He is obese. Pulmonary:      Effort: Pulmonary effort is normal.   Skin:     General: Skin is warm and dry. Comments: Oral lesions - none noted to inside mucosa. Lips with healing ulcers. No crusting noted. Right thumb with erythema, healing lesions without drainage. Left thumb - vesicular lesion noted, drainage present. Non tender. Ulcer noted with surrounding dark tissue. Neurological:      General: No focal deficit present. Mental Status: He is alert and oriented to person, place, and time. Mental status is at baseline. Psychiatric:         Mood and Affect: Mood normal.         Assessment/Plan     1. Impetigo: No concerning lesions noted on exam today, however he did not complete his course of Keflex. Have asked him to complete course of Keflex. Advised him that he may not return to his group activities until he has been taking antibiotic for 24 hours. Discussed highly transmissible disease.   - cephALEXin (KEFLEX) 500 MG capsule;  Take 1 capsule by mouth 4 times daily for 7 days  Dispense: 28 capsule; Refill: 0    2. Herpetic giovana: Right thumb healing. Left arm remains with vesicular lesion. Ulcer with dark surrounding tissue. Nontender does not appear infected on exam.  Advised him to follow-up with dermatology if lesion does not completely resolve. Much improved in comparison to photos entered an emergency room visit. - acyclovir (ZOVIRAX) 200 MG capsule; Take 2 capsules by mouth 3 times daily for 7 days  Dispense: 42 capsule; Refill: 0  - External Referral To Dermatology    3. Oral herpes: Healing. Much improved in comparison to photos entered at emergency room visit. Will treat with 7 days of acyclovir. Have asked her to follow-up if lesions do not completely resolve. If lesions return he is to follow-up as well as he may need suppressive therapy. - acyclovir (ZOVIRAX) 200 MG capsule; Take 2 capsules by mouth 3 times daily for 7 days  Dispense: 42 capsule; Refill: 0    4. Needs flu shot  - INFLUENZA, MDCK QUADV, 2 YRS AND OLDER, IM, PF, PREFILL SYR OR SDV, 0.5ML (FLUCELVAX QUADV, PF)      Orders Placed This Encounter   Procedures    INFLUENZA, MDCK QUADV, 2 YRS AND OLDER, IM, PF, PREFILL SYR OR SDV, 0.5ML (FLUCELVAX QUADV, PF)    External Referral To Dermatology     Referral Priority:   Routine     Referral Type:   Eval and Treat     Referral Reason:   Specialty Services Required     Requested Specialty:   Dermatology     Number of Visits Requested:   1       No follow-ups on file. OR sooner with questions, concerns, worsening symptoms    LIBBY STALEY, NP    9/13/2021  10:46 AM    Discussed use, benefit, and side effects of prescribed medications. Barriers to medication compliance addressed. Discussed all ordered testing and labs. All patient questions answered. Patient agreeable with plan above. Please note that this chart was generated using dragon dictation software.   Although every effort was made to ensure the accuracy of this automated transcription, some errors in transcription may have occurred.

## 2021-09-13 NOTE — LETTER
[facility]  [id number] [facility] [address]  Phone: [phone]  Fax: [phone]    LIBBY Art        September 13, 2021     Patient: Stacey Last   YOB: 1995   Date of Visit: 9/13/2021       To Whom It May Concern: It is my medical opinion that Stacey Last may return to group setting once he has taken antibiotic for 24 hours (Keflex). He should cover any open draining lesions with band aid or dry gauze. .    If you have any questions or concerns, please don't hesitate to call.     Sincerely,          LIBBY Art

## 2021-09-13 NOTE — PROGRESS NOTES
Patient responses:    Have you ever had a reaction to a flu vaccine? No  Do you have any current illness? No  Have you ever had Guillian Milton Syndrome? No  Do you have a serious allergy to any of the follow: Neomycin, Polymyxin, Thimerosal, eggs or egg products? No    Flu vaccine given per order. Please see immunization tab. Risks and benefits explained. Current VIS given.

## 2021-09-13 NOTE — PATIENT INSTRUCTIONS
Take all of your medications as prescribed, complete the course of both medications  Acyclovir is for herpes  Keflex (antibiotic) is for skin infection called impetigo  If lesion on your left thumb does not resolve, or worsens, you need to schedule with dermatology. I have given you this referral.   You should not return to your group therapy until you have been on antibiotic for 24 hours. You need to cover up any lesions that have drainage (with band aid or dry gauze) when around others. If your lesions to not completely resolve, please follow up. If they return, please follow up so we can start ongoing treatment. Patient Education        Impetigo: Care Instructions  Your Care Instructions  Impetigo (say \"qx-zuu-EX-go\") is a skin infection caused by bacteria. It causes blisters that break and become oozing, yellow, crusty sores. Impetigo can be anywhere on the body. Scratching the sores may spread the infection to other parts of the body. You can also spread it to others through close contact or when you share towels, clothing, and other items. Prescription antibiotic ointment or pills can usually cure impetigo. (After a day of antibiotics, the infection should not spread.)  Follow-up care is a key part of your treatment and safety. Be sure to make and go to all appointments, and call your doctor if you are having problems. It's also a good idea to know your test results and keep a list of the medicines you take. How can you care for yourself at home? · Apply antibiotic ointment exactly as instructed. · If your doctor prescribed antibiotic pills, take them as directed. Do not stop using them just because you feel better. You need to take the full course of antibiotics. · Gently wash the sores with soap and water each day. If crusts form, your doctor may advise you to soften or remove the crusts. You can do this by soaking them in warm water and patting them dry.  This can help the cream or ointment treat impetigo. · After you touch the area, wash your hands with soap and water. Or you can use an alcohol-based hand . · Don't share items such as towels, sheets, and clothing until the infection is gone. · Wash anything that may have touched the infected area. · Try to avoid scratching the area. When should you call for help? Call your doctor now or seek immediate medical care if:    · You have symptoms of a worse infection, such as:  ? Increased pain, swelling, warmth, or redness. ? Red streaks leading from the area. ? Pus draining from the area. ? A fever.     · Impetigo gets worse or spreads to other areas. Watch closely for changes in your health, and be sure to contact your doctor if:    · You do not get better as expected. Where can you learn more? Go to [email or web address] org and sign in to your [employer] account. Enter O127 in the [employer] box to learn more about \"Impetigo: Care Instructions. \"     If you do not have an account, please click on the \"Sign Up Now\" link. Current as of: February 10, 2021               Content Version: 12.9  © 2006-2021 [employer]. Care instructions adapted under license by [facility] ([facility]). If you have questions about a medical condition or this instruction, always ask your healthcare professional. [place] [address] any warranty or liability for your use of this information. Patient Education        Herpetic Giovana: Care Instructions  Your Care Instructions     Herpetic giovana is a finger infection. It's usually caused by the herpes virus that causes cold sores. It can spread to a finger from a cold sore in or around your mouth. Yulia Croton also can be caused by the virus that causes genital herpes. An area of your finger may be red. It may have a small group of blisters. Your finger also may hurt, itch, or tingle. Your finger should get better on its own. This may take a few weeks.  But

## 2022-01-12 ENCOUNTER — TELEPHONE (OUTPATIENT)
Dept: SURGERY | Age: 27
End: 2022-01-12

## 2022-01-12 ENCOUNTER — OFFICE VISIT (OUTPATIENT)
Dept: SURGERY | Age: 27
End: 2022-01-12
Payer: COMMERCIAL

## 2022-01-12 VITALS
BODY MASS INDEX: 35.29 KG/M2 | HEIGHT: 78 IN | SYSTOLIC BLOOD PRESSURE: 124 MMHG | HEART RATE: 103 BPM | OXYGEN SATURATION: 95 % | DIASTOLIC BLOOD PRESSURE: 82 MMHG | TEMPERATURE: 98 F | WEIGHT: 305 LBS

## 2022-01-12 DIAGNOSIS — K60.2 ANAL FISSURE: Primary | ICD-10-CM

## 2022-01-12 PROCEDURE — G8427 DOCREV CUR MEDS BY ELIG CLIN: HCPCS | Performed by: SURGERY

## 2022-01-12 PROCEDURE — 1036F TOBACCO NON-USER: CPT | Performed by: SURGERY

## 2022-01-12 PROCEDURE — G8482 FLU IMMUNIZE ORDER/ADMIN: HCPCS | Performed by: SURGERY

## 2022-01-12 PROCEDURE — G8417 CALC BMI ABV UP PARAM F/U: HCPCS | Performed by: SURGERY

## 2022-01-12 PROCEDURE — 99213 OFFICE O/P EST LOW 20 MIN: CPT | Performed by: SURGERY

## 2022-01-12 NOTE — PROGRESS NOTES
[facility]  [address]  Dept: [phone]  Dept Fax: [phone]  Loc: [phone]    Visit Date: 1/12/2022    Moriah Tracy is a 27 y.o. male who presents today for: Follow-up (Anal fissure )      HPI:       Moriah Tracy is a 27 y.o. male known to me for history of anal fissure. I last saw him about 7 months ago and at that point he had completely resolved his fissure. He tells me that over the last couple weeks he had some abnormal bowel movements and felt like he was having bleeding once again. Pain is minimal.    Past Medical History:   Diagnosis Date    ADHD (attention deficit hyperactivity disorder)     dx young child    Bipolar 1 disorder, manic, mild (HCC)     dx as a child    History of OCD (obsessive compulsive disorder)     dx as a child    Tourette syndrome     dx as a child     Past Surgical History:   Procedure Laterality Date    TONSILLECTOMY         Current Outpatient Medications:     sertraline (ZOLOFT) 50 MG tablet, , Disp: , Rfl:     INVEGA TRINZA 546 MG/1.75ML RONNIE IM injection, , Disp: , Rfl:   Allergies   Allergen Reactions    Amoxicillin Hives     Past Surgical History:   Procedure Laterality Date    TONSILLECTOMY       Family History   Problem Relation Age of Onset    Mental Illness Paternal Grandfather     Depression Mother     Anxiety Disorder Mother        Social History:   Social History     Tobacco Use    Smoking status: Former Smoker     Types: Cigarettes    Smokeless tobacco: Never Used   Substance Use Topics    Alcohol use: Not Currently     Comment: 3-4 beers      Tobacco cessation counseling provided as appropriate. REVIEW OF SYSTEMS:    Pertinent positives and negatives are mentioned in the HPI. Otherwise, all other systems were reviewed and negative.     Objective:     Physical Exam   /82   Pulse 103   Temp 98 °F (36.7 °C) (Oral) Ht 6' 6\" (1.981 m)   Wt (!) 305 lb (138.3 kg)   SpO2 95%   BMI 35.25 kg/m²   Constitutional: Appears well-developed and well-nourished. Grooming appropriate. No gross deformities. Body mass index is 35.25 kg/m². Eyes: No scleral icterus. Conjunctiva/lids normal. Vision intact grossly. Pupils equal/symmetric, reactive bilaterally. ENT: External ears/nose without defect, scars, or masses. Hearing grossly intact. No facial deformity. Lips normal, normal dentition. Neck: No masses. Trachea midline. No crepitus. Thyroid not enlarged. Cardiovascular: Normal rate. No peripheral edema. Abdominal aorta normal size to palpation. Pulmonary/Chest: Effort normal. No respiratory distress. No wheezes. No use of accessory muscles. Musculoskeletal: Normal range of motion of head/neck, without deformity, pain, or crepitus, with normal strength and tone. Normal gait. Nails without clubbing or cyanosis. Neurological: Alert and oriented to person, place, and time. No gross deficits. Sensation intact. Skin: Skin is dry. No rashes noted. No pallor. No induration of nodules. Psychiatric: Normal mood and affect. Behavior normal. Oriented to person, place, and time. Judgment and insight reasonable. Abdominal/wound: Soft, nontender, nondistended    Anorectal exam chaperone in room. Patient placed left position. Buttock spread. Cotton tip palpation revealed anterior midline anal fissure, tender. Labs reviewed: None     Imaging reviewed: None    Assessment/Plan:       A/P:  Established problem(s): Recurrent anal fissure  Additional workup/treatment planned: OCD, obesity  Risk of complications/morbidity: Moderate    Unfortunately, Danny Cardona has a recurrent anterior midline anal fissure, confirmed by cotton tip palpation.   Given the fact that he responded fairly well to ointment and medical management 7 months ago, I think it is prudent to start with Rx medical management once again and to see if he can heal this nonoperatively once again. I will see him back in 6 weeks to reassess    DISPOSITION:  F/u 6 wks    My findings will be relayed to consulting practitioner or PCP via Epic note    Note completed using dictation software, please excuse any errors.     Electronically signed by Lukas Kingston MD on 1/12/2022 at 2:00 PM

## 2022-01-20 ENCOUNTER — OFFICE VISIT (OUTPATIENT)
Dept: PRIMARY CARE CLINIC | Age: 27
End: 2022-01-20
Payer: COMMERCIAL

## 2022-01-20 VITALS
HEIGHT: 78 IN | TEMPERATURE: 97.7 F | SYSTOLIC BLOOD PRESSURE: 120 MMHG | OXYGEN SATURATION: 98 % | DIASTOLIC BLOOD PRESSURE: 80 MMHG | WEIGHT: 305.4 LBS | BODY MASS INDEX: 35.34 KG/M2 | HEART RATE: 86 BPM

## 2022-01-20 DIAGNOSIS — K59.04 CHRONIC IDIOPATHIC CONSTIPATION: ICD-10-CM

## 2022-01-20 DIAGNOSIS — F20.9 SCHIZOPHRENIA, UNSPECIFIED TYPE (HCC): ICD-10-CM

## 2022-01-20 DIAGNOSIS — Z23 HIGH PRIORITY FOR COVID-19 VACCINATION: Primary | ICD-10-CM

## 2022-01-20 DIAGNOSIS — R63.8 WEIGHT DISORDER: ICD-10-CM

## 2022-01-20 DIAGNOSIS — F17.200 SMOKER: ICD-10-CM

## 2022-01-20 DIAGNOSIS — Z23 NEED FOR HPV VACCINATION: ICD-10-CM

## 2022-01-20 DIAGNOSIS — F32.A DEPRESSION, UNSPECIFIED DEPRESSION TYPE: ICD-10-CM

## 2022-01-20 DIAGNOSIS — K60.2 ANAL FISSURE: ICD-10-CM

## 2022-01-20 DIAGNOSIS — G47.30 SLEEP APNEA, UNSPECIFIED TYPE: ICD-10-CM

## 2022-01-20 PROCEDURE — 90471 IMMUNIZATION ADMIN: CPT | Performed by: INTERNAL MEDICINE

## 2022-01-20 PROCEDURE — 99215 OFFICE O/P EST HI 40 MIN: CPT | Performed by: INTERNAL MEDICINE

## 2022-01-20 PROCEDURE — 90651 9VHPV VACCINE 2/3 DOSE IM: CPT | Performed by: INTERNAL MEDICINE

## 2022-01-20 RX ORDER — HYDROCORTISONE 25 MG/G
CREAM TOPICAL 2 TIMES DAILY PRN
Qty: 2 EACH | Refills: 3 | Status: SHIPPED | OUTPATIENT
Start: 2022-01-20 | End: 2022-04-20 | Stop reason: SDUPTHER

## 2022-01-20 RX ORDER — SERTRALINE HYDROCHLORIDE 100 MG/1
TABLET, FILM COATED ORAL
COMMUNITY
Start: 2021-12-29

## 2022-01-20 RX ORDER — DOCUSATE SODIUM 100 MG/1
100 CAPSULE, LIQUID FILLED ORAL 2 TIMES DAILY PRN
Qty: 60 CAPSULE | Refills: 3 | Status: SHIPPED | OUTPATIENT
Start: 2022-01-20 | End: 2022-04-20 | Stop reason: SDUPTHER

## 2022-01-20 ASSESSMENT — PATIENT HEALTH QUESTIONNAIRE - PHQ9
SUM OF ALL RESPONSES TO PHQ QUESTIONS 1-9: 2
SUM OF ALL RESPONSES TO PHQ QUESTIONS 1-9: 2
2. FEELING DOWN, DEPRESSED OR HOPELESS: 1
SUM OF ALL RESPONSES TO PHQ QUESTIONS 1-9: 2
SUM OF ALL RESPONSES TO PHQ9 QUESTIONS 1 & 2: 2
1. LITTLE INTEREST OR PLEASURE IN DOING THINGS: 1
SUM OF ALL RESPONSES TO PHQ QUESTIONS 1-9: 2

## 2022-01-20 ASSESSMENT — ENCOUNTER SYMPTOMS
COUGH: 0
WHEEZING: 0
DIARRHEA: 1
CONSTIPATION: 1
EYES NEGATIVE: 1
SHORTNESS OF BREATH: 0
BLOOD IN STOOL: 1
CHEST TIGHTNESS: 0

## 2022-01-20 NOTE — PROGRESS NOTES
Subjective:      Patient ID: Zenobia Frias is a 27 y.o. male. 1/20/2022    Ms Cunha's Patient presents with:  Established New Doctor    Here with      Mom says he needs evaluation for sleep apnoea         Review of Systems   Constitutional: Negative for chills, fatigue and fever. All vaccines up to date growing up    Covid Vac 6/21    Flu vac 9/21    Tdap 6/18   HENT: Negative. Dental care >>   Eyes: Negative. Negative for visual disturbance. Eye exam  >   Respiratory: Negative for cough, chest tightness, shortness of breath and wheezing. Smokes randomly     Etoh occasionally  heavy     Denies  rec drugs     No asthma    Cardiovascular: Negative. No known HTN/CAD     Mom with HTN but no CAD    Gastrointestinal: Positive for blood in stool, constipation and diarrhea. Sees colorectal surgeon     H/o Fissure    Endocrine:        FH of Diabetes    Genitourinary: Positive for frequency. Negative for dysuria and urgency. Musculoskeletal: Positive for arthralgias. Skin: Negative for rash. Allergic/Immunologic: Negative for environmental allergies and food allergies. Neurological: Negative for dizziness, tremors, weakness, light-headedness and headaches. Psychiatric/Behavioral: Positive for dysphoric mood. Negative for behavioral problems, sleep disturbance and suicidal ideas. The patient is nervous/anxious. C/O Psychiatry  With [facility]        Objective:   Physical Exam  Vitals and nursing note reviewed. Constitutional:       General: He is not in acute distress. Appearance: He is obese. HENT:      Left Ear: External ear normal.      Nose: Nose normal.   Eyes:      Extraocular Movements: Extraocular movements intact. Neck:      Thyroid: No thyromegaly. Vascular: No JVD. Cardiovascular:      Rate and Rhythm: Normal rate and regular rhythm. Heart sounds: Normal heart sounds.    Pulmonary:      Effort: Pulmonary effort is normal.      Breath sounds: Normal breath sounds. Abdominal:      General: Bowel sounds are normal. There is distension. Palpations: Abdomen is soft. Tenderness: There is no abdominal tenderness. Musculoskeletal:         General: Normal range of motion. Cervical back: Normal range of motion and neck supple. Skin:     General: Skin is warm and dry. Neurological:      Mental Status: He is oriented to person, place, and time. Sensory: No sensory deficit. Motor: Tremor (fine) present. Psychiatric:         Mood and Affect: Mood normal.         Behavior: Behavior normal.         Thought Content: Thought content does not include suicidal ideation. Assessment:      Suzan Crawford was seen today for established new doctor. Time spent counseling was more than 50% for 40 minutes. Diagnoses and all orders for this visit:    High priority for COVID-19 vaccination    Need for HPV vaccination    Chronic idiopathic constipation  -     docusate sodium (COLACE) 100 MG capsule; Take 1 capsule by mouth 2 times daily as needed for Constipation  -     hydrocortisone (ANUSOL-HC) 2.5 % CREA rectal cream; Place rectally 2 times daily as needed for Hemorrhoids    Anal fissure  -     docusate sodium (COLACE) 100 MG capsule;  Take 1 capsule by mouth 2 times daily as needed for Constipation  -     hydrocortisone (ANUSOL-HC) 2.5 % CREA rectal cream; Place rectally 2 times daily as needed for Hemorrhoids    Sleep apnea, unspecified type  -     Jonas Becerra MD, Pulmonary, [facility]       weight disorder  Advised low carb diet + exercise reg       Smoker    cautioned       Schizophrenia, unspecified type ([facility] [address]  C/o Psychiatry     Depression, unspecified depression type  C/O Psychiatry             Plan:              Skyler Kaufman MD

## 2022-02-01 ENCOUNTER — OFFICE VISIT (OUTPATIENT)
Dept: SLEEP MEDICINE | Age: 27
End: 2022-02-01
Payer: COMMERCIAL

## 2022-02-01 VITALS
OXYGEN SATURATION: 98 % | BODY MASS INDEX: 35.99 KG/M2 | WEIGHT: 304.8 LBS | SYSTOLIC BLOOD PRESSURE: 100 MMHG | TEMPERATURE: 97.3 F | HEART RATE: 90 BPM | RESPIRATION RATE: 18 BRPM | DIASTOLIC BLOOD PRESSURE: 60 MMHG | HEIGHT: 77 IN

## 2022-02-01 DIAGNOSIS — G47.33 OBSTRUCTIVE SLEEP APNEA: Primary | ICD-10-CM

## 2022-02-01 DIAGNOSIS — R06.83 SNORING: ICD-10-CM

## 2022-02-01 DIAGNOSIS — R06.81 WITNESSED EPISODE OF APNEA: ICD-10-CM

## 2022-02-01 DIAGNOSIS — G47.19 EXCESSIVE DAYTIME SLEEPINESS: ICD-10-CM

## 2022-02-01 DIAGNOSIS — R06.89 GASPING FOR BREATH: ICD-10-CM

## 2022-02-01 DIAGNOSIS — E66.9 OBESITY (BMI 35.0-39.9 WITHOUT COMORBIDITY): ICD-10-CM

## 2022-02-01 PROCEDURE — 99204 OFFICE O/P NEW MOD 45 MIN: CPT | Performed by: PSYCHIATRY & NEUROLOGY

## 2022-02-01 ASSESSMENT — SLEEP AND FATIGUE QUESTIONNAIRES
HOW LIKELY ARE YOU TO NOD OFF OR FALL ASLEEP WHILE SITTING AND READING: 0
HOW LIKELY ARE YOU TO NOD OFF OR FALL ASLEEP WHILE LYING DOWN TO REST IN THE AFTERNOON WHEN CIRCUMSTANCES PERMIT: 2
HOW LIKELY ARE YOU TO NOD OFF OR FALL ASLEEP WHILE WATCHING TV: 0
HOW LIKELY ARE YOU TO NOD OFF OR FALL ASLEEP WHILE SITTING AND TALKING TO SOMEONE: 1
NECK CIRCUMFERENCE (INCHES): 17.5
HOW LIKELY ARE YOU TO NOD OFF OR FALL ASLEEP IN A CAR, WHILE STOPPED FOR A FEW MINUTES IN TRAFFIC: 0
HOW LIKELY ARE YOU TO NOD OFF OR FALL ASLEEP WHEN YOU ARE A PASSENGER IN A CAR FOR AN HOUR WITHOUT A BREAK: 1
HOW LIKELY ARE YOU TO NOD OFF OR FALL ASLEEP WHILE SITTING INACTIVE IN A PUBLIC PLACE: 0
HOW LIKELY ARE YOU TO NOD OFF OR FALL ASLEEP WHILE SITTING QUIETLY AFTER LUNCH WITHOUT ALCOHOL: 1
ESS TOTAL SCORE: 5

## 2022-02-01 ASSESSMENT — ENCOUNTER SYMPTOMS
EYES NEGATIVE: 1
GASTROINTESTINAL NEGATIVE: 1
ALLERGIC/IMMUNOLOGIC NEGATIVE: 1
CHOKING: 1
APNEA: 1

## 2022-02-01 NOTE — PROGRESS NOTES
MD FLASH Hayes Board Certified in Sleep Medicine  Certified [facility] Sleep Medicine  Board Certified in Neurology [address]  [address], [address], [address]  [address], [address]           [address] [facility] SLEEP MEDICINE [address] [address] [phone]    Subjective:     Patient ID: Piyush Camahco is a 27 y.o. male. Chief Complaint   Patient presents with    Establish Care    Snoring       HPI:        Piyush Camacho is a 27 y.o. male referred by Dr. Misael Johnston for a sleep evaluation. He complains of snoring, snorting, choking, periods of not breathing, tossing and turning, decreased concentration, excessive daytime sleepiness, feels sleepy during the day, take naps during the day but he denies knees buckling with laughing, completely or partially paralyzed while falling asleep or waking up, noisy environment, uncomfortable room temperature, uncomfortable bedding. Symptoms began a few months ago, gradually worsening since that time. The patient's bed-partner confirmed the snoring and stopped breathing at night. SLEEP SCHEDULE: Goes to bed around 9 PM-12 AM in the weekdays and 9 PM-12 AM in the weekends. It usually takes the patient 120 minutes to fall asleep. The patient gets up 1-4 per night to go to the bathroom. The Patient finally gets up at 8 AM-noon during the weekdays and 8 AM-12 noon in the weekends. The patient has restless sleep with frequent arousals in addition to the Patient has significant daytime sleepiness. The Patient scored Total score: 5 on [place] Sleepiness Scale ( more than 10 is indicative of daytime sleepiness). The patient takes 2-3 naps/week for several hours and usually is not refreshing nap. Previous evaluation and treatment has included- none.       The Patient has been obese for many years and tried, has gained 80 in the last 5 years,  unsuccessfully to lose weight through diet, exercise. DOT/CDL - N/A  MYLES/Khadra - N/A       Previous Report(s) Reviewed: historical medical records       Social History     Socioeconomic History    Marital status: Single     Spouse name: Not on file    Number of children: 0    Years of education: 15    Highest education level: Not on file   Occupational History    Occupation: unemployed   Tobacco Use    Smoking status: Current Some Day Smoker     Packs/day: 0.25     Types: Cigarettes    Smokeless tobacco: Never Used   Vaping Use    Vaping Use: Never used   Substance and Sexual Activity    Alcohol use: Not Currently     Comment: social    Drug use: Not Currently     Types: Marijuana (Weed), Methamphetamines (Crystal Meth), Cocaine    Sexual activity: Never   Other Topics Concern    Not on file   Social History Narrative    Not on file     Social Determinants of Health     Financial Resource Strain: Low Risk     Difficulty of Paying Living Expenses: Not hard at all   Food Insecurity: No Food Insecurity    Worried About Running Out of Food in the Last Year: Never true    Desire of Food in the Last Year: Never true   Transportation Needs:     Lack of Transportation (Medical): Not on file    Lack of Transportation (Non-Medical):  Not on file   Physical Activity:     Days of Exercise per Week: Not on file    Minutes of Exercise per Session: Not on file   Stress:     Feeling of Stress : Not on file   Social Connections:     Frequency of Communication with Friends and Family: Not on file    Frequency of Social Gatherings with Friends and Family: Not on file    Attends Congregational Services: Not on file    Active Member of Clubs or Organizations: Not on file    Attends Club or Organization Meetings: Not on file    Marital Status: Not on file   Intimate Partner Violence:     Fear of Current or Ex-Partner: Not on file    Emotionally Abused: Not on file    Physically Abused: Not on file    Sexually Abused: Not on file   Housing Stability:     Unable to Pay for Housing in the Last Year: Not on file    Number of Places Lived in the Last Year: Not on file    Unstable Housing in the Last Year: Not on file       Prior to Admission medications    Medication Sig Start Date End Date Taking? Authorizing Provider   sertraline (ZOLOFT) 100 MG tablet  12/29/21  Yes Historical Provider, MD   docusate sodium (COLACE) 100 MG capsule Take 1 capsule by mouth 2 times daily as needed for Constipation 1/20/22  Yes Lucero Shrestha MD   hydrocortisone (ANUSOL-HC) 2.5 % CREA rectal cream Place rectally 2 times daily as needed for Hemorrhoids 1/20/22  Yes Lucero Shrestha MD   Caleen Negar 546 MG/1.75ML RONNIE IM injection  1/20/21  Yes Historical Provider, MD       Allergies as of 02/01/2022 - Fully Reviewed 02/01/2022   Allergen Reaction Noted    Amoxicillin Hives 11/09/2016       Patient Active Problem List   Diagnosis    Schizophreniform disorder with good prognostic features ([facility] [address]    Psychological trauma    Tourette's syndrome    Impaired social interaction    Suicidal ideation    History of OCD (obsessive compulsive disorder)    Laceration of right hand without foreign body    Diarrhea    Grade III hemorrhoids       Past Medical History:   Diagnosis Date    ADHD (attention deficit hyperactivity disorder)     dx young child    Bipolar 1 disorder, manic, mild ([address]     dx as a child    History of OCD (obsessive compulsive disorder)     dx as a child    Tourette syndrome     dx as a child       Past Surgical History:   Procedure Laterality Date    TONSILLECTOMY         Family History   Problem Relation Age of Onset    Mental Illness Paternal Grandfather     Depression Mother     Anxiety Disorder Mother        Review of Systems   Constitutional: Positive for fatigue and unexpected weight change.  Negative for diaphoresis. HENT: Negative for congestion. Eyes: Negative. Respiratory: Positive for apnea and choking. Cardiovascular: Negative. Gastrointestinal: Negative. Endocrine: Negative. Genitourinary: Positive for frequency (1-4). Musculoskeletal: Negative. Skin: Negative. Allergic/Immunologic: Negative. Neurological: Negative for headaches. Hematological: Negative. Psychiatric/Behavioral: Positive for dysphoric mood and sleep disturbance. The patient is nervous/anxious. Objective:     Vitals:  Weight BMI Neck circumference    Wt Readings from Last 3 Encounters:   02/01/22 (!) 304 lb 12.8 oz (138.3 kg)   01/20/22 (!) 305 lb 6.4 oz (138.5 kg)   01/12/22 (!) 305 lb (138.3 kg)    Body mass index is 36.14 kg/m². Neck circumference: 17.5     BP HR SaO2   BP Readings from Last 3 Encounters:   02/01/22 100/60   01/20/22 120/80   01/12/22 124/82    Pulse Readings from Last 3 Encounters:   02/01/22 90   01/20/22 86   01/12/22 103    SpO2 Readings from Last 3 Encounters:   02/01/22 98%   01/20/22 98%   01/12/22 95%        The mandibular molar Class :   []1 []2 []3      Mallampati I Airway Classification:   []1 []2 []3 []4        Physical Exam  Vitals and nursing note reviewed. Constitutional:       Appearance: Normal appearance. HENT:      Head: Atraumatic. Nose: Congestion present. Mouth/Throat:      Comments: Mallampati class 4, no retrognathia or hypognathia ,  no septum deviation , crowded oropharynx with low soft palate, high arched hard palate,no tonsils enlargement. Eyes:      Extraocular Movements: Extraocular movements intact. Cardiovascular:      Rate and Rhythm: Normal rate and regular rhythm. Heart sounds: Normal heart sounds. Pulmonary:      Effort: Pulmonary effort is normal.      Breath sounds: Normal breath sounds. Musculoskeletal:         General: Normal range of motion. Cervical back: Normal range of motion and neck supple.    Skin: General: Skin is warm. Neurological:      Mental Status: Mental status is at baseline. Psychiatric:         Mood and Affect: Mood normal.         Assessment:    Obstructive sleep apnea especially with snoring, snorting,  observed apnea, daytime sleepiness, large neck circumference, Mallampati class of 4 and obesity. Diagnosis Orders   1. Obstructive sleep apnea  Baseline Diagnostic Sleep Study    Sleep Study with PAP Titration   2. Obesity (BMI 35.0-39.9 without comorbidity)  Baseline Diagnostic Sleep Study   3. Snoring  Baseline Diagnostic Sleep Study   4. Witnessed episode of apnea  Baseline Diagnostic Sleep Study   5. Gasping for breath  Baseline Diagnostic Sleep Study   6. Excessive daytime sleepiness  Baseline Diagnostic Sleep Study     Plan:     Patient was counseled about the pathophysiology of obstructive sleep apnea syndrome and the methods for evaluating its presence and severity. Patient was counseled to avoid driving and other potentially hazardous circumstances if the patient is experiencing excessive sleepiness. Treatment considerations include the use of nasal CPAP, oral dental appliance or a surgical intervention, which should be based on otolarygologic findings, In the meantime, the patient should be cautioned to avoid the use of alcohol or other depressant medications because of potential for increasing the duration and severity of apnea and cautioned regarding driving or operating and dangerous equipment if the patient is experiencing daytime sleepiness. We discussed the proportionality between weight and AHI. With 10% weight change, the AHI has a 27% proportionate change. With 20% weight change, the AHI has a 45-50% proportionate change. Orders Placed This Encounter   Procedures    Baseline Diagnostic Sleep Study    Sleep Study with PAP Titration       Return for F/U between 31 and 90 days from the recieving CPAP.     Cathleen Jameson MD  Medical Director - [facility]

## 2022-02-01 NOTE — PATIENT INSTRUCTIONS
Orders Placed This Encounter   Procedures    Baseline Diagnostic Sleep Study     Standing Status:   Future     Standing Expiration Date:   2/1/2023     Order Specific Question:   Adult or Pediatric     Answer:   Adult Study (>7 Years)     Order Specific Question:   Location For Sleep Study     Answer:   [place]     Order Specific Question:   Select Sleep Lab Location     Answer:   [facility]    Sleep Study with PAP Titration     Standing Status:   Future     Standing Expiration Date:   2/1/2023     Order Specific Question:   Sleep Study Titration Type     Answer:   CPAP     Order Specific Question:   Location For Sleep Study     Answer:   [place]     Order Specific Question:   Select Sleep Lab Location     Answer:   [facility]        Patient Education        Sleep Apnea: Care Instructions  Overview     Sleep apnea means that you frequently stop breathing for 10 seconds or longer during sleep. It can be mild to severe, based on the number of times an hour that you stop breathing. Blocked or narrowed airways in your nose, mouth, or throat can cause sleep apnea. Your airway can become blocked when your throat muscles and tongue relax during sleep. You can help treat sleep apnea at home by making lifestyle changes. You also can use a CPAP breathing machine that keeps tissues in the throat from blocking your airway. Or your doctor may suggest that you use a breathing device while you sleep. It helps keep your airway open. This could be a device that you put in your mouth. In some cases, surgery may be needed to remove enlarged tissues in the throat. Follow-up care is a key part of your treatment and safety. Be sure to make and go to all appointments, and call your doctor if you are having problems. It's also a good idea to know your test results and keep a list of the medicines you take. How can you care for yourself at home? · Lose weight, if needed. · Sleep on your side.  It may help mild apnea.  · Avoid alcohol and medicines such as sleeping pills, opioids, or sedatives before bed. · Don't smoke. If you need help quitting, talk to your doctor. · Prop up the head of your bed. · Treat breathing problems, such as a stuffy nose, that are caused by a cold or allergies. · Try a continuous positive airway pressure (CPAP) breathing machine if your doctor recommends it. · If CPAP doesn't work for you, ask your doctor if you can try other masks, settings, or breathing machines. · Try oral breathing devices or other nasal devices. · Talk to your doctor if your nose feels dry or bleeds, or if it gets runny or stuffy when you use a breathing machine. · Tell your doctor if you're sleepy during the day and it affects your daily life. Don't drive or operate machinery when you're drowsy. When should you call for help? Watch closely for changes in your health, and be sure to contact your doctor if:    · You still have sleep apnea even though you have made lifestyle changes.     · You are thinking of trying a device such as CPAP.     · You are having problems using a CPAP or similar machine.     · You are still sleepy during the day, and it affects your daily life. Where can you learn more? Go to [email or web address] org and sign in to your [employer] account. Enter Q823 in the [facility] box to learn more about \"Sleep Apnea: Care Instructions. \"     If you do not have an account, please click on the \"Sign Up Now\" link. Current as of: July 6, 2021               Content Version: 13.1  © 0186-5721 Healthwise, Incorporated. Care instructions adapted under license by [address] If you have questions about a medical condition or this instruction, always ask your healthcare professional. [place] [address] any warranty or liability for your use of this information.

## 2022-02-22 ENCOUNTER — OFFICE VISIT (OUTPATIENT)
Dept: SURGERY | Age: 27
End: 2022-02-22
Payer: COMMERCIAL

## 2022-02-22 VITALS
OXYGEN SATURATION: 97 % | DIASTOLIC BLOOD PRESSURE: 83 MMHG | HEART RATE: 88 BPM | TEMPERATURE: 97.7 F | SYSTOLIC BLOOD PRESSURE: 129 MMHG | BODY MASS INDEX: 35.71 KG/M2 | HEIGHT: 77 IN | RESPIRATION RATE: 14 BRPM | WEIGHT: 302.4 LBS

## 2022-02-22 DIAGNOSIS — K60.2 ANAL FISSURE: Primary | ICD-10-CM

## 2022-02-22 PROCEDURE — 99213 OFFICE O/P EST LOW 20 MIN: CPT | Performed by: SURGERY

## 2022-02-22 NOTE — PROGRESS NOTES
[facility] COL[facility]  [address]  Dept: [phone]  Dept Fax: [phone]  Loc: [phone]    Visit Date: 2/22/2022    Ignacia Roberto is a 27 y.o. male who presents today for: Follow-up (Anal Fissure-Follow Up ( States still has bleeding when wiping after BM))      HPI:       Ignacia Roberto is a 27 y.o. male known to me for chronic anal fissure. Still having some bleeding. Denies pain. Has not been using the ointment as instructed, sometimes once a day, but sometimes not at all.     Past Medical History:   Diagnosis Date    ADHD (attention deficit hyperactivity disorder)     dx young child    Bipolar 1 disorder, manic, mild ([facility] [address]     dx as a child    History of OCD (obsessive compulsive disorder)     dx as a child    Tourette syndrome     dx as a child     Past Surgical History:   Procedure Laterality Date    TONSILLECTOMY         Current Outpatient Medications:     sertraline (ZOLOFT) 100 MG tablet, , Disp: , Rfl:     docusate sodium (COLACE) 100 MG capsule, Take 1 capsule by mouth 2 times daily as needed for Constipation, Disp: 60 capsule, Rfl: 3    hydrocortisone (ANUSOL-HC) 2.5 % CREA rectal cream, Place rectally 2 times daily as needed for Hemorrhoids, Disp: 2 each, Rfl: 3    INVEGA TRINZA 546 MG/1.75ML RONNIE IM injection, , Disp: , Rfl:   Allergies   Allergen Reactions    Amoxicillin Hives     Past Surgical History:   Procedure Laterality Date    TONSILLECTOMY       Family History   Problem Relation Age of Onset    Mental Illness Paternal Grandfather     Depression Mother     Anxiety Disorder Mother        Social History:   Social History     Tobacco Use    Smoking status: Current Some Day Smoker     Packs/day: 0.25     Types: Cigarettes    Smokeless tobacco: Never Used   Substance Use Topics    Alcohol use: Not Currently     Comment: social      Tobacco cessation counseling provided as appropriate. REVIEW OF SYSTEMS:    Pertinent positives and negatives are mentioned in the HPI. Otherwise, all other systems were reviewed and negative. Objective:     Physical Exam   /83   Pulse 88   Temp 97.7 °F (36.5 °C) (Infrared)   Resp 14   Ht 6' 5\" (1.956 m)   Wt (!) 302 lb 6.4 oz (137.2 kg)   SpO2 97%   BMI 35.86 kg/m²   Constitutional: Appears well-developed and well-nourished. Grooming appropriate. No gross deformities. Body mass index is 35.86 kg/m². Eyes: No scleral icterus. Conjunctiva/lids normal. Vision intact grossly. Pupils equal/symmetric, reactive bilaterally. ENT: External ears/nose without defect, scars, or masses. Hearing grossly intact. No facial deformity. Lips normal, normal dentition. Neck: No masses. Trachea midline. No crepitus. Thyroid not enlarged. Cardiovascular: Normal rate. No peripheral edema. Abdominal aorta normal size to palpation. Pulmonary/Chest: Effort normal. No respiratory distress. No wheezes. No use of accessory muscles. Musculoskeletal: Normal range of motion of head/neck, without deformity, pain, or crepitus, with normal strength and tone. Normal gait. Nails without clubbing or cyanosis. Neurological: Alert and oriented to person, place, and time. No gross deficits. Sensation intact. Skin: Skin is dry. No rashes noted. No pallor. No induration of nodules. Psychiatric: Normal mood and affect. Behavior normal. Oriented to person, place, and time. Judgment and insight reasonable. Abdominal/wound: Soft, obese, nontender    Anorectal exam with chaperone in room. Patient placed left lower position. Buttock spread.   [place] to palpation reveals continued anterior midline anal fissure with tenderness    Labs reviewed: None     Imaging reviewed: None    Assessment/Plan:       A/P:  Established problem(s): Chronic anal fissure with bleeding  Additional workup/treatment planned: Trial of calcium channel blocker prescription ointment  Risk of complications/morbidity: Moderate    I again discussed with Salome White the diagnosis of the chronic anal fissure. Discussed that 50-60% of patients will heal with medical management only, so I would really like him to try to use the ointment as instructed twice daily. This will help avoid a potentially painful and difficult surgery. Continue with current prescription medications    DISPOSITION:  F/u 6-8 wks    My findings will be relayed to consulting practitioner or PCP via Epic note    Note completed using dictation software, please excuse any errors.     Electronically signed by Michelle Samaniego MD on 2/22/2022 at 1:05 PM

## 2022-02-22 NOTE — LETTER
[facility]) Colorectal Surgery  [address]  Phone: [phone]  Fax: [phone]    Mumtaz Nguyễn MD    February 22, 2022     Kalin Juani, [address] [place] [address]    Patient: Pako Zamora   MR Number: <[id number]>   YOB: 1995   Date of Visit: 2/22/2022       Dear Kalin Gloria:    Thank you for referring Pako Zamora to me for evaluation/treatment. Below are the relevant portions of my assessment and plan of care. If you have questions, please do not hesitate to call me. I look forward to following Karyle Amabile along with you.     Sincerely,      Mumtaz Nguyễn MD

## 2022-04-20 ENCOUNTER — OFFICE VISIT (OUTPATIENT)
Dept: PRIMARY CARE CLINIC | Age: 27
End: 2022-04-20
Payer: COMMERCIAL

## 2022-04-20 VITALS
HEIGHT: 77 IN | HEART RATE: 96 BPM | SYSTOLIC BLOOD PRESSURE: 110 MMHG | OXYGEN SATURATION: 97 % | DIASTOLIC BLOOD PRESSURE: 70 MMHG | TEMPERATURE: 98.1 F | WEIGHT: 308.6 LBS | BODY MASS INDEX: 36.44 KG/M2

## 2022-04-20 DIAGNOSIS — R63.8 WEIGHT DISORDER: ICD-10-CM

## 2022-04-20 DIAGNOSIS — K59.04 CHRONIC IDIOPATHIC CONSTIPATION: ICD-10-CM

## 2022-04-20 DIAGNOSIS — Z23 HIGH PRIORITY FOR COVID-19 VACCINATION: ICD-10-CM

## 2022-04-20 DIAGNOSIS — K60.2 ANAL FISSURE: ICD-10-CM

## 2022-04-20 DIAGNOSIS — Z23 NEED FOR PNEUMOCOCCAL VACCINATION: Primary | ICD-10-CM

## 2022-04-20 DIAGNOSIS — E78.2 MIXED HYPERLIPIDEMIA: ICD-10-CM

## 2022-04-20 LAB
A/G RATIO: 1.9 (ref 1.1–2.2)
ALBUMIN SERPL-MCNC: 4.5 G/DL (ref 3.4–5)
ALP BLD-CCNC: 93 U/L (ref 40–129)
ALT SERPL-CCNC: 18 U/L (ref 10–40)
ANION GAP SERPL CALCULATED.3IONS-SCNC: 11 MMOL/L (ref 3–16)
AST SERPL-CCNC: 15 U/L (ref 15–37)
BILIRUB SERPL-MCNC: 0.3 MG/DL (ref 0–1)
BUN BLDV-MCNC: 14 MG/DL (ref 7–20)
CALCIUM SERPL-MCNC: 9.4 MG/DL (ref 8.3–10.6)
CHLORIDE BLD-SCNC: 106 MMOL/L (ref 99–110)
CHOLESTEROL, TOTAL: 195 MG/DL (ref 0–199)
CO2: 23 MMOL/L (ref 21–32)
CREAT SERPL-MCNC: 1.2 MG/DL (ref 0.9–1.3)
GFR AFRICAN AMERICAN: >60
GFR NON-AFRICAN AMERICAN: >60
GLUCOSE BLD-MCNC: 86 MG/DL (ref 70–99)
HDLC SERPL-MCNC: 41 MG/DL (ref 40–60)
LDL CHOLESTEROL CALCULATED: 129 MG/DL
POTASSIUM SERPL-SCNC: 4.5 MMOL/L (ref 3.5–5.1)
SODIUM BLD-SCNC: 140 MMOL/L (ref 136–145)
TOTAL PROTEIN: 6.9 G/DL (ref 6.4–8.2)
TRIGL SERPL-MCNC: 125 MG/DL (ref 0–150)
TSH SERPL DL<=0.05 MIU/L-ACNC: 2.81 UIU/ML (ref 0.27–4.2)
VLDLC SERPL CALC-MCNC: 25 MG/DL

## 2022-04-20 PROCEDURE — 90677 PCV20 VACCINE IM: CPT | Performed by: INTERNAL MEDICINE

## 2022-04-20 PROCEDURE — 99214 OFFICE O/P EST MOD 30 MIN: CPT | Performed by: INTERNAL MEDICINE

## 2022-04-20 RX ORDER — DOCUSATE SODIUM 100 MG/1
100 CAPSULE, LIQUID FILLED ORAL 2 TIMES DAILY PRN
Qty: 60 CAPSULE | Refills: 3 | Status: ON HOLD | OUTPATIENT
Start: 2022-04-20 | End: 2022-05-23 | Stop reason: SDUPTHER

## 2022-04-20 RX ORDER — RISPERIDONE 1 MG/1
TABLET, FILM COATED ORAL
COMMUNITY
Start: 2022-03-31

## 2022-04-20 RX ORDER — HYDROCORTISONE 25 MG/G
CREAM TOPICAL 2 TIMES DAILY PRN
Qty: 2 EACH | Refills: 3 | Status: SHIPPED | OUTPATIENT
Start: 2022-04-20

## 2022-04-20 ASSESSMENT — PATIENT HEALTH QUESTIONNAIRE - PHQ9
9. THOUGHTS THAT YOU WOULD BE BETTER OFF DEAD, OR OF HURTING YOURSELF: 0
SUM OF ALL RESPONSES TO PHQ QUESTIONS 1-9: 1
8. MOVING OR SPEAKING SO SLOWLY THAT OTHER PEOPLE COULD HAVE NOTICED. OR THE OPPOSITE, BEING SO FIGETY OR RESTLESS THAT YOU HAVE BEEN MOVING AROUND A LOT MORE THAN USUAL: 0
2. FEELING DOWN, DEPRESSED OR HOPELESS: 1
7. TROUBLE CONCENTRATING ON THINGS, SUCH AS READING THE NEWSPAPER OR WATCHING TELEVISION: 0
3. TROUBLE FALLING OR STAYING ASLEEP: 0
SUM OF ALL RESPONSES TO PHQ QUESTIONS 1-9: 1
4. FEELING TIRED OR HAVING LITTLE ENERGY: 0
5. POOR APPETITE OR OVEREATING: 0
6. FEELING BAD ABOUT YOURSELF - OR THAT YOU ARE A FAILURE OR HAVE LET YOURSELF OR YOUR FAMILY DOWN: 0
10. IF YOU CHECKED OFF ANY PROBLEMS, HOW DIFFICULT HAVE THESE PROBLEMS MADE IT FOR YOU TO DO YOUR WORK, TAKE CARE OF THINGS AT HOME, OR GET ALONG WITH OTHER PEOPLE: 0

## 2022-04-20 ASSESSMENT — ENCOUNTER SYMPTOMS
SHORTNESS OF BREATH: 0
BLOOD IN STOOL: 1
COUGH: 0
WHEEZING: 0
CONSTIPATION: 1
EYES NEGATIVE: 1
DIARRHEA: 1
CHEST TIGHTNESS: 0

## 2022-04-20 NOTE — PROGRESS NOTES
Subjective:      Patient ID: Jasmyne Peres is a 27 y.o. male. 4/20/2022 Patient presents with:  3 Month Follow-Up  Check-Up              Last seen  1/20/2022    Ms Dara Mendoza Patient presents with:  Established New Doctor    Here with      Mom says he needs evaluation for sleep apnoea         Review of Systems   Constitutional: Negative for chills, fatigue and fever. All vaccines up to date growing up    Covid Vac 6/21    Flu vac 9/21    Tdap 6/18    Pneum Vac    HENT: Negative. Dental care >>   Eyes: Negative. Negative for visual disturbance. Eye exam  >   Respiratory: Negative for cough, chest tightness, shortness of breath and wheezing. Smokes randomly     Etoh occasionally  heavy     Denies  rec drugs     No asthma    Cardiovascular: Negative. No known HTN/CAD     Mom with HTN but no CAD    Gastrointestinal: Positive for blood in stool, constipation and diarrhea. Sees colorectal surgeon     H/o Fissure    Endocrine:        FH of Diabetes    Genitourinary: Positive for frequency. Negative for dysuria and urgency. Musculoskeletal: Positive for arthralgias. Skin: Negative for rash. Allergic/Immunologic: Negative for environmental allergies and food allergies. Neurological: Negative for dizziness, tremors, weakness, light-headedness and headaches. Psychiatric/Behavioral: Positive for dysphoric mood. Negative for behavioral problems, sleep disturbance and suicidal ideas. The patient is nervous/anxious. C/O Psychiatry  With [facility] Behavioral care        Objective:   Physical Exam  Vitals and nursing note reviewed. Constitutional:       General: He is not in acute distress. Appearance: He is obese. Eyes:      Extraocular Movements: Extraocular movements intact. Cardiovascular:      Rate and Rhythm: Normal rate and regular rhythm. Heart sounds: Normal heart sounds. Pulmonary:      Breath sounds: Normal breath sounds. Abdominal:      General: Bowel sounds are normal. There is distension. Palpations: Abdomen is soft. Tenderness: There is no abdominal tenderness. Musculoskeletal:         General: Normal range of motion. Cervical back: Neck supple. Skin:     General: Skin is warm and dry. Neurological:      Mental Status: He is oriented to person, place, and time. Sensory: No sensory deficit. Motor: Tremors: fine. Psychiatric:         Mood and Affect: Mood normal.         Behavior: Behavior normal.         Thought Content: Thought content does not include suicidal ideation. Assessment:     Daniel Morales was seen today for 3 month follow-up and check-up. Diagnoses and all orders for this visit:    Need for pneumococcal vaccination    High priority for COVID-19 vaccination  Advised to get third booster     Chronic idiopathic constipation  -     docusate sodium (COLACE) 100 MG capsule; Take 1 capsule by mouth 2 times daily as needed for Constipation  -     hydrocortisone (ANUSOL-HC) 2.5 % CREA rectal cream; Place rectally 2 times daily as needed for Hemorrhoids    Anal fissure C/O Rectal Surgeon   -     docusate sodium (COLACE) 100 MG capsule; Take 1 capsule by mouth 2 times daily as needed for Constipation  -     hydrocortisone (ANUSOL-HC) 2.5 % CREA rectal cream; Place rectally 2 times daily as needed for Hemorrhoids    Mixed hyperlipidemia  Diet treatment only . -     CBC with Auto Differential; Future  -     Comprehensive Metabolic Panel; Future  -     Hemoglobin A1C; Future  -     Lipid Panel; Future  -     TSH; Future  -     Urinalysis; Future    Weight disorder  BMI > 36  Advised low carb diet . Recuse Etoh intake . Exercise reg         Sleep apnea, unspecified type  Did see Pulmonary ;  Unsure if Sleep Studies were done   -      Smoker    cautioned strongly again       Schizophrenia, unspecified type ([facility] [address]  C/o Psychiatry     Depression, unspecified depression type  C/O Psychiatry Plan:              Lucia Larson MD

## 2022-04-21 LAB
BASOPHILS ABSOLUTE: 0 K/UL (ref 0–0.2)
BASOPHILS RELATIVE PERCENT: 0.5 %
EOSINOPHILS ABSOLUTE: 0.1 K/UL (ref 0–0.6)
EOSINOPHILS RELATIVE PERCENT: 1.3 %
ESTIMATED AVERAGE GLUCOSE: 102.5 MG/DL
HBA1C MFR BLD: 5.2 %
HCT VFR BLD CALC: 47 % (ref 40.5–52.5)
HEMOGLOBIN: 15.2 G/DL (ref 13.5–17.5)
LYMPHOCYTES ABSOLUTE: 2.6 K/UL (ref 1–5.1)
LYMPHOCYTES RELATIVE PERCENT: 35.6 %
MCH RBC QN AUTO: 28.1 PG (ref 26–34)
MCHC RBC AUTO-ENTMCNC: 32.4 G/DL (ref 31–36)
MCV RBC AUTO: 86.8 FL (ref 80–100)
MONOCYTES ABSOLUTE: 0.4 K/UL (ref 0–1.3)
MONOCYTES RELATIVE PERCENT: 6 %
NEUTROPHILS ABSOLUTE: 4.1 K/UL (ref 1.7–7.7)
NEUTROPHILS RELATIVE PERCENT: 56.6 %
PDW BLD-RTO: 14.3 % (ref 12.4–15.4)
PLATELET # BLD: 185 K/UL (ref 135–450)
PMV BLD AUTO: 9.9 FL (ref 5–10.5)
RBC # BLD: 5.41 M/UL (ref 4.2–5.9)
WBC # BLD: 7.3 K/UL (ref 4–11)

## 2022-04-28 ENCOUNTER — TELEPHONE (OUTPATIENT)
Dept: PRIMARY CARE CLINIC | Age: 27
End: 2022-04-28

## 2022-04-28 ENCOUNTER — OFFICE VISIT (OUTPATIENT)
Dept: SURGERY | Age: 27
End: 2022-04-28
Payer: COMMERCIAL

## 2022-04-28 VITALS
WEIGHT: 306.4 LBS | HEART RATE: 92 BPM | DIASTOLIC BLOOD PRESSURE: 85 MMHG | BODY MASS INDEX: 36.18 KG/M2 | RESPIRATION RATE: 16 BRPM | SYSTOLIC BLOOD PRESSURE: 124 MMHG | OXYGEN SATURATION: 97 % | HEIGHT: 77 IN

## 2022-04-28 DIAGNOSIS — K60.2 ANAL FISSURE: Primary | ICD-10-CM

## 2022-04-28 DIAGNOSIS — Z86.59 HISTORY OF OCD (OBSESSIVE COMPULSIVE DISORDER): ICD-10-CM

## 2022-04-28 PROCEDURE — 99214 OFFICE O/P EST MOD 30 MIN: CPT | Performed by: SURGERY

## 2022-04-28 RX ORDER — SODIUM CHLORIDE 0.9 % (FLUSH) 0.9 %
5-40 SYRINGE (ML) INJECTION EVERY 12 HOURS SCHEDULED
Status: CANCELLED | OUTPATIENT
Start: 2022-04-28

## 2022-04-28 RX ORDER — SODIUM CHLORIDE 0.9 % (FLUSH) 0.9 %
5-40 SYRINGE (ML) INJECTION PRN
Status: CANCELLED | OUTPATIENT
Start: 2022-04-28

## 2022-04-28 RX ORDER — SODIUM CHLORIDE 9 MG/ML
INJECTION, SOLUTION INTRAVENOUS PRN
Status: CANCELLED | OUTPATIENT
Start: 2022-04-28

## 2022-04-28 NOTE — PROGRESS NOTES
Packs/day: 0.25     Types: Cigarettes    Smokeless tobacco: Never Used   Substance Use Topics    Alcohol use: Not Currently     Comment: social      Tobacco cessation counseling provided as appropriate. REVIEW OF SYSTEMS:    Pertinent positives and negatives are mentioned in the HPI. Otherwise, all other systems were reviewed and negative. Objective:     Physical Exam   /85   Pulse 92   Resp 16   Ht 6' 5\" (1.956 m)   Wt (!) 306 lb 6.4 oz (139 kg)   SpO2 97%   BMI 36.33 kg/m²   Constitutional: Appears well-developed and well-nourished. Grooming appropriate. No gross deformities. Body mass index is 36.33 kg/m². Eyes: No scleral icterus. Conjunctiva/lids normal. Vision intact grossly. Pupils equal/symmetric, reactive bilaterally. ENT: External ears/nose without defect, scars, or masses. Hearing grossly intact. No facial deformity. Lips normal, normal dentition. Neck: No masses. Trachea midline. No crepitus. Thyroid not enlarged. Cardiovascular: Normal rate. No peripheral edema. Abdominal aorta normal size to palpation. Pulmonary/Chest: Effort normal. No respiratory distress. No wheezes. No use of accessory muscles. Musculoskeletal: Normal range of motion of head/neck, without deformity, pain, or crepitus, with normal strength and tone. Normal gait. Nails without clubbing or cyanosis. Neurological: Alert and oriented to person, place, and time. No gross deficits. Sensation intact. Skin: Skin is dry. No rashes noted. No pallor. No induration of nodules. Psychiatric: Normal mood and affect. Behavior normal. Oriented to person, place, and time. Judgment and insight reasonable. Abdominal/wound: Soft, nontender, nondistended    Anorectal exam with chaperone in room. Patient placed left position. Buttock spread.   Continued tenderness noted in the anterior midline    Labs reviewed: none     Imaging reviewed: none    Assessment/Plan:       A/P:  Established problem(s): Anal fissure, chronic  Additional workup/treatment planned: EUA, fissurectomy with partial lateral internal sphincterotomy  Risk of complications/morbidity: Moderate    Unfortunately, despite aggressive medical management with calcium channel blocker prescription ointment, he is still having symptoms. I discussed surgical options, including partial lateral internal sphincterotomy versus Botox injection. After discussing the risks, benefits, alternatives, recurrence rates, complications of both options, he would like to proceed with partial lateral internal sphincterotomy. I also discussed that it is not unreasonable to continue with medical management, especially considering that he initially responded well during his first anal fissure a few years back. He does understand small but real risk of fecal incontinence. I had a discussion with the patient regarding the risks, benefits, and alternatives of the procedure. Risks include, but are not limited to: bleeding, infection, missed lesions, need for additional procedures or operations, thrombosis of external hemorrhoids, urinary retention, pelvic sepsis, and sphincter stretch or damage, which could result in temporary or rarely permanent incontinence to stool or flatus. Anesthesia options discussed. Postoperative pain discussed, and time away from work or usual daily activities discussed. All questions answered to patient's satisfaction. Discussed higher risk of morbidity and mortality due to history of: obesity, OCD    Continue with current prescription medications    DISPOSITION:  Schedule surgery    My findings will be relayed to consulting practitioner or PCP via Epic note    Note completed using dictation software, please excuse any errors.     Electronically signed by Krysta Brumfield MD on 4/28/2022 at 2:03 PM

## 2022-04-28 NOTE — LETTER
[facility] - Surgeons [address] [phone]  f [phone]    Patel Raya MD                        SURGERY ORDER   -- Time of order -- 22    2:07 PM    Facility:   [facility] # _________________                                                                                    Scheduled By:____________                  Surgery Date & Time: 22 at 1:15pm                       Pt arrival: 11:15AM                                                                                      Patient Name:  Elvis Rowland     :  1995     PCP:  Bianca Betancourt MD      Home Ph:    [phone] (home)                                                     PROCEDURE:  Exam under anesthesia, fissurectomy, partial lateral internal sphcinterotomy [id number], [id number], [id number]    DIAGNOSIS:      ICD-10-CM    1. Anal fissure  K60.2    2. History of OCD (obsessive compulsive disorder)  Z86.59        Anesthesia: _General    Time Needed:  15 minutes    Pt Position:  prone/danette-knife    Bowel Prep: fleets enema         Outpatient _x__ Admit ___                  Pre-Op to be done by: __N/A___    Cardiac Clearance Done by: ____N/A____    Medications to be stopped 5 days before surgery: __N/A______    Additional / Special Orders: covid vaccinated                                                                                                                                                                                                  Patel Raya MD  Insurance:                                ID #                                        Ph #     (secondary ?)       Date called ______        Candace Reyes to: _____ @ _____           Precert Needed?  Yes  /  No    PreAuth # & Details _______________________________________________________                        ______ Elaine Cervantes 2 Verification _[phone]_      Post Op_________              ____Inst given                 _____ Candace Reyes to Pt/Spouse

## 2022-05-02 ENCOUNTER — TELEPHONE (OUTPATIENT)
Dept: SURGERY | Age: 27
End: 2022-05-02

## 2022-05-02 NOTE — TELEPHONE ENCOUNTER
Patient has been scheduled for:    Procedure: Kareem RANGEL Sphinc  Date: 5/23/22  Time: 1:15pm  Arrival: 11:15AM  Hospital: [facility]     Covid: Vaccinated  ASA?: N/A  Prep? Npo and fleets if able    Pre-op? N/A    Post-op Appt? 6/15/22 at 1:30pm     This was scheduled with the patient's caregiver Thuy Cramer [phone]    Orders routed to surgery scheduling. Instructions have been mailed/emailed to:    Emailed to Thuy Cramer at [email or web address]    Mailed to Yari Mendez at [facility]  [place], [address]

## 2022-05-18 NOTE — PROGRESS NOTES
Place patient label inside box (if no patient label, complete below)  Name:  :  MR#:     [address] / PROCEDURE  1. I (we)Bhargav  authorize DR Hector Rosenberg  and/or such assistants as may be selected by him/her, to perform the following operation/procedure(s): EXAM UNDER ANESTHESIA, FISSURECTOMY, PARTIAL LATERAL INTERNAL SPHINCTEROTOMY       Note: If unable to obtain consent prior to an emergent procedure, document the emergent reason in the medical record. This procedure has been explained to my (our) satisfaction and included in the explanation was:  A) The intended benefit, nature, and extent of the procedure to be performed;  B) The significant risks involved and the probability of success;  C) Alternative procedures and methods of treatment;  D) The dangers and probable consequences of such alternatives (including no procedure or treatment); E) The expected consequences of the procedure on my future health;  F) Whether other qualified individuals would be performing important surgical tasks and/or whether  would be present to advise or support the procedure. I (we) understand that there are other risks of infection and other serious complications in the pre-operative/procedural and postoperative/procedural stages of my (our) care. I (we) have asked all of the questions which I (we) thought were important in deciding whether or not to undergo treatment or diagnosis. These questions have been answered to my (our) satisfaction. I (we) understand that no assurance can be given that the procedure will be a success, and no guarantee or warranty of success has been given to me (us). 2. It has been explained to me (us) that during the course of the operation/procedure, unforeseen conditions may be revealed that necessitate extension of the original procedure(s) or different procedure(s) than those set forth in Paragraph 1.  I (we) authorize and request that the above-named physician, his/her assistants or his/her designees, perform procedures as necessary and desirable if deemed to be in my (our) best interest.     Revised 8/2/2021                                                                          Page 1 of 2           3. I acknowledge that health care personnel may be observing this procedure for the purpose of medical education or other specified purposes as may be necessary as requested and/or approved by my (our) physician. 4. I (we) consent to the disposal by the hospital Pathologist of the removed tissue, parts or organs in accordance with hospital policy. 5. I do ____ do not ____ consent to the use of a local infiltration pain blocking agent that will be used by my provider/surgical provider to help alleviate pain during my procedure. 6. I do ____ do not ____ consent to an emergent blood transfusion in the case of a life-threatening situation that requires blood components to be administered. This consent is valid for 24 hours from the beginning of the procedure. 7. This patient does ____ or does not ____ currently have a DNR status/order. If DNR order is in place, obtain Addendum to the Surgical Consent for ALL Patients with a DNR Order to address kory-operative status for limited intervention or DNR suspension.      8. I have read and fully understand the above Consent for Operation/Procedure and that all blanks were completed before I signed the consent.   _____________________________       _____________________      ____/____am/pm  Signature of Patient or legal representative      Printed Name / Relationship            Date / Time   ____________________________       _____________________      ____/____am/pm  Witness to Signature                                    Printed Name                    Date / Time     If patient is unable to sign or is a minor, complete the following)  Patient is a minor, ____ years of age, or unable to sign because:   ______________________________________________________________________________________________    [place] Karsten If a phone consent is obtained, consent will be documented by using two health care professionals, each affirming that the consenting party has no questions and gives consent for the procedure discussed with the physician/provider.   _____________________          ____________________       _____/_____am/pm   2nd witness to phone consent        Printed name           Date / Time    Informed Consent:  I have provided the explanation described above in section 1 to the patient and/or legal representative.  I have provided the patient and/or legal representative with an opportunity to ask any questions about the proposed operation/procedure.   ___________________________          ____________________         ____/____am/pm  Provider / Proceduralist                            Printed name            Date / Time  Revised 8/2/2021                                                                      Page 2 of 2

## 2022-05-18 NOTE — PROGRESS NOTES
[facility] PRE-SURGICAL TESTING INSTRUCTIONS                                  PRIOR TO PROCEDURE DATE:        1. PLEASE FOLLOW ANY  GUIDELINES/ INSTRUCTIONS PRIOR TO YOUR PROCEDURE AS ADVISED BY YOUR SURGEON. 2. Arrange for someone to drive you home and be with you for the first 24 hours after discharge for your safety after your procedure for which you received sedation. Ensure it is someone we can share information with regarding your discharge. 3. You must contact your surgeon for instructions IF:   You are taking any blood thinners, aspirin, anti-inflammatory or vitamin E.   There is a change in your physical condition such as a cold, fever, rash, cuts, sores or any other infection, especially near your surgical site. 4. Do not drink alcohol the day before or day of your procedure. 5. A Pre-op History and Physical for surgery MUST be completed by your Physician or Urgent Care within 30 days of your procedure date. Please bring a copy with you on the day of your procedure and along with any other testing performed. THE DAY OF YOUR PROCEDURE:  1. Follow instructions for ARRIVAL TIME as DIRECTED BY YOUR SURGEON. 2. Enter the MAIN entrance from [facility] and follow the signs to the free [address] or [address] parking (offered free of charge 6am-5pm). 3. Enter the Main Entrance of the hospital (do not enter from the lower level of the parking garage). Upon entrance, check in with the  at the main desk on your left. If no one is available at the desk, proceed into the [facility] Waiting Room and go through the door directly into the [facility]. There is a Check-in desk ACROSS from Room 5 (marked with a sign hanging from the ceiling). The phone number for the surgery center is [phone]. 4. Please call [phone] option #2 option #2 if you have not been preregistered yet.   On the day of your procedure bring your insurance card and photo ID. You will be registered at your bedside once brought back to your room. 5. DO NOT EAT ANYTHING eight hours prior to your arrival for surgery. May have 8 ounces of water 4 hours prior to your arrival for surgery. NOTE: ALL Gastric, Bariatric and Bowel surgery patients MUST follow their surgeon's instructions. 6. MEDICATIONS    Take the following medications with a SMALL sip of water:    Bariatric patient's call surgeon if on diabetic medications as some need to be stopped 1 week preop   Use your usual dose of inhalers the morning of surgery. BRING your rescue inhaler with you to hospital.    Anesthesia does NOT want you to take insulin the morning of surgery. They will control your blood sugar while you are at the hospital. Please contact your ordering physician for instructions regarding your insulin the night before your procedure. If you have an insulin pump, please keep it set on basal rate. 7. Do not swallow water when brushing teeth. No gum, candy, mints or ice chips. Refrain from smoking or at least decrease the amount. 8. Dress in loose, comfortable clothing appropriate for redressing after your procedure. Do not wear jewelry (including body piercings), make-up (especially NO eye make-up), fingernail polish (NO toenail polish if foot/leg surgery), lotion, powders or metal hairclips. 9. Dentures, glasses, or contacts will need to be removed before your procedure. Bring cases for your glasses, contacts, dentures, or hearing aids to protect them while you are in surgery. 10. If you use a CPAP, please bring it with you on the day of your procedure. 11. We recommend that valuable personal  belongings such as cash, cell phones, e-tablets or jewelry, be left at home during your stay. The hospital will not be responsible for valuables that are not secured in the hospital safe.  However, if your insurance requires a co-pay, you may want to bring a method of payment, i.e. Check or credit card, if you wish to pay your co-pay the day of surgery. 12. If you are to stay overnight, you may bring a bag with personal items. Please have any large items you may need brought in by your family after your arrival to your hospital room. 15. If you have a Living Will or Durable Power of , please bring a copy on the day of your procedure. 15. With your permission, one family member may accompany you while you are being prepared for surgery. Once you are ready, additional family members may join you. HOW WE KEEP YOU SAFE and WORK TO PREVENT SURGICAL SITE INFECTIONS:  1. Health care workers should always check your ID bracelet to verify your name and birth date. You will be asked many times to state your name, date of birth, and allergies. 2. Health care workers should always clean their hands with soap or alcohol gel before providing care to you. It is okay to ask anyone if they cleaned their hands before they touch you. 3. You will be actively involved in verifying the type of procedure you are having and ensuring the correct surgical site. This will be confirmed multiple times prior to your procedure. Do NOT odilia your surgery site UNLESS instructed to by your surgeon. 4. Do not shave or wax for 72 hours prior to procedure near your operative site. Shaving with a razor can irritate your skin and make it easier to develop an infection. On the day of your procedure, any hair that needs to be removed near the surgical site will be clipped by a healthcare worker using a special clippers designed to avoid skin irritation. 5. When you are in the operating room, your surgical site will be cleansed with a special soap, and in most cases, you will be given an antibiotic before the surgery begins. What to expect AFTER YOUR PROCEDURE:  1. Immediately following your procedure, your will be taken to the PACU for the first phase of your recovery.   Your nurse will help you recover from any potential side effects of anesthesia, such as extreme drowsiness, changes in your vital signs or breathing patterns. Nausea, headache, muscle aches, or sore throat may also occur after anesthesia. Your nurse will help you manage these potential side effects. 2. For comfort and safety, arrange to have someone at home with you for the first 24 hours after discharge. 3. You and your family will be given written instructions about your diet, activity, dressing care, medications, and return visits. 4. Once at home, should issues with nausea, pain, or bleeding occur, or should you notice any signs of infection, you should call your surgeon. 5. Always clean your hands before and after caring for your wound. Do not let your family touch your surgery site without cleaning their hands. 6. Narcotic pain medications can cause significant constipation. You may want to add a stool softener to your postoperative medication schedule or speak to your surgeon on how best to manage this SIDE EFFECT. SPECIAL INSTRUCTIONS     Thank you for allowing us to care for you. We strive to exceed your expectations in the delivery of care and service provided to you and your family. If you need to contact the [place] [address] staff for any reason, please call us at [phone]    Instructions reviewed with patient during preadmission testing phone interview.   Helena Rodarte RN.5/18/2022 .11:30 AM      ADDITIONAL EDUCATIONAL INFORMATION REVIEWED PER PHONE WITH YOU AND/OR YOUR FAMILY:  No Hibiclens® Bathing Instructions   Yes Antibacterial Soap

## 2022-05-20 ENCOUNTER — ANESTHESIA EVENT (OUTPATIENT)
Dept: OPERATING ROOM | Age: 27
End: 2022-05-20
Payer: COMMERCIAL

## 2022-05-20 ENCOUNTER — TELEPHONE (OUTPATIENT)
Dept: SURGERY | Age: 27
End: 2022-05-20

## 2022-05-20 NOTE — TELEPHONE ENCOUNTER
I have placed a reminder call to patient for upcoming procedure. Did you speak directly to patient or leave a voicemail? Voicemail    Prep? NPO 12AM  Fleets enema      Must have a  that is over the age of 25. Must be a friend or family member that can be responsible for signing them out after surgery.     Arrive at the main entrance [facility] at 11:30AM

## 2022-05-23 ENCOUNTER — ANESTHESIA (OUTPATIENT)
Dept: OPERATING ROOM | Age: 27
End: 2022-05-23
Payer: COMMERCIAL

## 2022-05-23 ENCOUNTER — HOSPITAL ENCOUNTER (OUTPATIENT)
Age: 27
Setting detail: OUTPATIENT SURGERY
Discharge: HOME | End: 2022-05-23
Attending: SURGERY | Admitting: SURGERY
Payer: COMMERCIAL

## 2022-05-23 VITALS
SYSTOLIC BLOOD PRESSURE: 135 MMHG | WEIGHT: 311.8 LBS | HEART RATE: 79 BPM | OXYGEN SATURATION: 95 % | DIASTOLIC BLOOD PRESSURE: 82 MMHG | BODY MASS INDEX: 36.82 KG/M2 | TEMPERATURE: 97.5 F | HEIGHT: 77 IN | RESPIRATION RATE: 16 BRPM

## 2022-05-23 DIAGNOSIS — G89.18 POST-OP PAIN: Primary | ICD-10-CM

## 2022-05-23 DIAGNOSIS — K60.2 ANAL FISSURE: ICD-10-CM

## 2022-05-23 DIAGNOSIS — K59.04 CHRONIC IDIOPATHIC CONSTIPATION: ICD-10-CM

## 2022-05-23 PROCEDURE — 7100000010 HC PHASE II RECOVERY - FIRST 15 MIN: Performed by: SURGERY

## 2022-05-23 PROCEDURE — C9290 INJ, BUPIVACAINE LIPOSOME: HCPCS | Performed by: SURGERY

## 2022-05-23 PROCEDURE — 6360000002 HC RX W HCPCS: Performed by: NURSE ANESTHETIST, CERTIFIED REGISTERED

## 2022-05-23 PROCEDURE — 3700000000 HC ANESTHESIA ATTENDED CARE: Performed by: SURGERY

## 2022-05-23 PROCEDURE — 3600000003 HC SURGERY LEVEL 3 BASE: Performed by: SURGERY

## 2022-05-23 PROCEDURE — 2580000003 HC RX 258: Performed by: SURGERY

## 2022-05-23 PROCEDURE — 46200 REMOVAL OF ANAL FISSURE: CPT | Performed by: SURGERY

## 2022-05-23 PROCEDURE — 7100000000 HC PACU RECOVERY - FIRST 15 MIN: Performed by: SURGERY

## 2022-05-23 PROCEDURE — 7100000001 HC PACU RECOVERY - ADDTL 15 MIN: Performed by: SURGERY

## 2022-05-23 PROCEDURE — 2709999900 HC NON-CHARGEABLE SUPPLY: Performed by: SURGERY

## 2022-05-23 PROCEDURE — 3600000013 HC SURGERY LEVEL 3 ADDTL 15MIN: Performed by: SURGERY

## 2022-05-23 PROCEDURE — 2580000003 HC RX 258: Performed by: ANESTHESIOLOGY

## 2022-05-23 PROCEDURE — 7100000011 HC PHASE II RECOVERY - ADDTL 15 MIN: Performed by: SURGERY

## 2022-05-23 PROCEDURE — A4217 STERILE WATER/SALINE, 500 ML: HCPCS | Performed by: SURGERY

## 2022-05-23 PROCEDURE — 2500000003 HC RX 250 WO HCPCS: Performed by: NURSE ANESTHETIST, CERTIFIED REGISTERED

## 2022-05-23 PROCEDURE — 6360000002 HC RX W HCPCS: Performed by: SURGERY

## 2022-05-23 PROCEDURE — 3700000001 HC ADD 15 MINUTES (ANESTHESIA): Performed by: SURGERY

## 2022-05-23 RX ORDER — DEXAMETHASONE SODIUM PHOSPHATE 4 MG/ML
INJECTION, SOLUTION INTRA-ARTICULAR; INTRALESIONAL; INTRAMUSCULAR; INTRAVENOUS; SOFT TISSUE PRN
Status: DISCONTINUED | OUTPATIENT
Start: 2022-05-23 | End: 2022-05-23 | Stop reason: SDUPTHER

## 2022-05-23 RX ORDER — MIDAZOLAM HYDROCHLORIDE 1 MG/ML
INJECTION INTRAMUSCULAR; INTRAVENOUS PRN
Status: DISCONTINUED | OUTPATIENT
Start: 2022-05-23 | End: 2022-05-23 | Stop reason: SDUPTHER

## 2022-05-23 RX ORDER — DIAZEPAM 5 MG/1
5 TABLET ORAL EVERY 8 HOURS
Qty: 30 TABLET | Refills: 0 | Status: SHIPPED | OUTPATIENT
Start: 2022-05-23 | End: 2022-06-02

## 2022-05-23 RX ORDER — DIPHENHYDRAMINE HYDROCHLORIDE 50 MG/ML
12.5 INJECTION INTRAMUSCULAR; INTRAVENOUS
Status: DISCONTINUED | OUTPATIENT
Start: 2022-05-23 | End: 2022-05-23 | Stop reason: HOSPADM

## 2022-05-23 RX ORDER — DOCUSATE SODIUM 100 MG/1
100 CAPSULE, LIQUID FILLED ORAL 2 TIMES DAILY
Qty: 28 CAPSULE | Refills: 0 | Status: SHIPPED | OUTPATIENT
Start: 2022-05-23 | End: 2022-06-06

## 2022-05-23 RX ORDER — LORAZEPAM 2 MG/ML
0.5 INJECTION INTRAMUSCULAR
Status: DISCONTINUED | OUTPATIENT
Start: 2022-05-23 | End: 2022-05-23 | Stop reason: HOSPADM

## 2022-05-23 RX ORDER — OXYCODONE HYDROCHLORIDE 5 MG/1
5 TABLET ORAL PRN
Status: DISCONTINUED | OUTPATIENT
Start: 2022-05-23 | End: 2022-05-23 | Stop reason: HOSPADM

## 2022-05-23 RX ORDER — FENTANYL CITRATE 50 UG/ML
25 INJECTION, SOLUTION INTRAMUSCULAR; INTRAVENOUS EVERY 5 MIN PRN
Status: DISCONTINUED | OUTPATIENT
Start: 2022-05-23 | End: 2022-05-23 | Stop reason: HOSPADM

## 2022-05-23 RX ORDER — SODIUM CHLORIDE 0.9 % (FLUSH) 0.9 %
5-40 SYRINGE (ML) INJECTION EVERY 12 HOURS SCHEDULED
Status: DISCONTINUED | OUTPATIENT
Start: 2022-05-23 | End: 2022-05-23 | Stop reason: HOSPADM

## 2022-05-23 RX ORDER — FENTANYL CITRATE 50 UG/ML
INJECTION, SOLUTION INTRAMUSCULAR; INTRAVENOUS PRN
Status: DISCONTINUED | OUTPATIENT
Start: 2022-05-23 | End: 2022-05-23 | Stop reason: SDUPTHER

## 2022-05-23 RX ORDER — SODIUM CHLORIDE 9 MG/ML
INJECTION, SOLUTION INTRAVENOUS PRN
Status: DISCONTINUED | OUTPATIENT
Start: 2022-05-23 | End: 2022-05-23 | Stop reason: HOSPADM

## 2022-05-23 RX ORDER — ONDANSETRON 2 MG/ML
4 INJECTION INTRAMUSCULAR; INTRAVENOUS
Status: DISCONTINUED | OUTPATIENT
Start: 2022-05-23 | End: 2022-05-23 | Stop reason: HOSPADM

## 2022-05-23 RX ORDER — MAGNESIUM HYDROXIDE 1200 MG/15ML
LIQUID ORAL CONTINUOUS PRN
Status: DISCONTINUED | OUTPATIENT
Start: 2022-05-23 | End: 2022-05-23 | Stop reason: HOSPADM

## 2022-05-23 RX ORDER — SODIUM CHLORIDE, SODIUM LACTATE, POTASSIUM CHLORIDE, CALCIUM CHLORIDE 600; 310; 30; 20 MG/100ML; MG/100ML; MG/100ML; MG/100ML
INJECTION, SOLUTION INTRAVENOUS CONTINUOUS
Status: DISCONTINUED | OUTPATIENT
Start: 2022-05-23 | End: 2022-05-23 | Stop reason: HOSPADM

## 2022-05-23 RX ORDER — ONDANSETRON 2 MG/ML
INJECTION INTRAMUSCULAR; INTRAVENOUS PRN
Status: DISCONTINUED | OUTPATIENT
Start: 2022-05-23 | End: 2022-05-23 | Stop reason: SDUPTHER

## 2022-05-23 RX ORDER — SODIUM CHLORIDE 0.9 % (FLUSH) 0.9 %
5-40 SYRINGE (ML) INJECTION PRN
Status: DISCONTINUED | OUTPATIENT
Start: 2022-05-23 | End: 2022-05-23 | Stop reason: HOSPADM

## 2022-05-23 RX ORDER — SODIUM CHLORIDE 9 MG/ML
INJECTION, SOLUTION INTRAVENOUS CONTINUOUS
Status: DISCONTINUED | OUTPATIENT
Start: 2022-05-23 | End: 2022-05-23 | Stop reason: HOSPADM

## 2022-05-23 RX ORDER — DOCUSATE SODIUM 100 MG/1
100 CAPSULE, LIQUID FILLED ORAL 2 TIMES DAILY PRN
Qty: 60 CAPSULE | Refills: 3 | Status: SHIPPED | OUTPATIENT
Start: 2022-05-23 | End: 2022-09-03 | Stop reason: SDUPTHER

## 2022-05-23 RX ORDER — PROCHLORPERAZINE EDISYLATE 5 MG/ML
5 INJECTION INTRAMUSCULAR; INTRAVENOUS
Status: DISCONTINUED | OUTPATIENT
Start: 2022-05-23 | End: 2022-05-23 | Stop reason: HOSPADM

## 2022-05-23 RX ORDER — LIDOCAINE HYDROCHLORIDE 20 MG/ML
INJECTION, SOLUTION INTRAVENOUS PRN
Status: DISCONTINUED | OUTPATIENT
Start: 2022-05-23 | End: 2022-05-23 | Stop reason: SDUPTHER

## 2022-05-23 RX ORDER — OXYCODONE HYDROCHLORIDE AND ACETAMINOPHEN 5; 325 MG/1; MG/1
1 TABLET ORAL EVERY 6 HOURS PRN
Qty: 28 TABLET | Refills: 0 | Status: SHIPPED | OUTPATIENT
Start: 2022-05-23 | End: 2022-05-30

## 2022-05-23 RX ORDER — SODIUM CHLORIDE 9 MG/ML
25 INJECTION, SOLUTION INTRAVENOUS PRN
Status: DISCONTINUED | OUTPATIENT
Start: 2022-05-23 | End: 2022-05-23 | Stop reason: HOSPADM

## 2022-05-23 RX ORDER — LABETALOL HYDROCHLORIDE 5 MG/ML
10 INJECTION, SOLUTION INTRAVENOUS
Status: DISCONTINUED | OUTPATIENT
Start: 2022-05-23 | End: 2022-05-23 | Stop reason: HOSPADM

## 2022-05-23 RX ORDER — ROCURONIUM BROMIDE 10 MG/ML
INJECTION, SOLUTION INTRAVENOUS PRN
Status: DISCONTINUED | OUTPATIENT
Start: 2022-05-23 | End: 2022-05-23 | Stop reason: SDUPTHER

## 2022-05-23 RX ORDER — GLYCOPYRROLATE 1 MG/5 ML
SYRINGE (ML) INTRAVENOUS PRN
Status: DISCONTINUED | OUTPATIENT
Start: 2022-05-23 | End: 2022-05-23 | Stop reason: SDUPTHER

## 2022-05-23 RX ORDER — PROPOFOL 10 MG/ML
INJECTION, EMULSION INTRAVENOUS PRN
Status: DISCONTINUED | OUTPATIENT
Start: 2022-05-23 | End: 2022-05-23 | Stop reason: SDUPTHER

## 2022-05-23 RX ORDER — OXYCODONE HYDROCHLORIDE 5 MG/1
10 TABLET ORAL PRN
Status: DISCONTINUED | OUTPATIENT
Start: 2022-05-23 | End: 2022-05-23 | Stop reason: HOSPADM

## 2022-05-23 RX ADMIN — PROPOFOL 250 MG: 10 INJECTION, EMULSION INTRAVENOUS at 12:44

## 2022-05-23 RX ADMIN — SUGAMMADEX 400 MG: 100 INJECTION, SOLUTION INTRAVENOUS at 13:31

## 2022-05-23 RX ADMIN — Medication 0.2 MG: at 12:50

## 2022-05-23 RX ADMIN — SODIUM CHLORIDE, POTASSIUM CHLORIDE, SODIUM LACTATE AND CALCIUM CHLORIDE: 600; 310; 30; 20 INJECTION, SOLUTION INTRAVENOUS at 11:43

## 2022-05-23 RX ADMIN — LIDOCAINE HYDROCHLORIDE 50 MG: 20 INJECTION, SOLUTION INTRAVENOUS at 12:44

## 2022-05-23 RX ADMIN — MIDAZOLAM HYDROCHLORIDE 2 MG: 2 INJECTION, SOLUTION INTRAMUSCULAR; INTRAVENOUS at 12:34

## 2022-05-23 RX ADMIN — FENTANYL CITRATE 100 MCG: 50 INJECTION, SOLUTION INTRAMUSCULAR; INTRAVENOUS at 12:42

## 2022-05-23 RX ADMIN — ONDANSETRON 4 MG: 2 INJECTION INTRAMUSCULAR; INTRAVENOUS at 12:50

## 2022-05-23 RX ADMIN — DEXAMETHASONE SODIUM PHOSPHATE 4 MG: 4 INJECTION, SOLUTION INTRAMUSCULAR; INTRAVENOUS at 12:50

## 2022-05-23 RX ADMIN — ROCURONIUM BROMIDE 50 MG: 10 INJECTION INTRAVENOUS at 12:44

## 2022-05-23 ASSESSMENT — PAIN SCALES - GENERAL
PAINLEVEL_OUTOF10: 0

## 2022-05-23 ASSESSMENT — PAIN - FUNCTIONAL ASSESSMENT
PAIN_FUNCTIONAL_ASSESSMENT: NONE - DENIES PAIN
PAIN_FUNCTIONAL_ASSESSMENT: 0-10

## 2022-05-23 NOTE — ANESTHESIA PRE PROCEDURE
Department of Anesthesiology  Preprocedure Note       Name:  Natalia Silva   Age:  24 y.o.  :  1995                                          MRN:  [id number]         Date:  2022      Surgeon: Melisa Ramos):  Alejandra Campuzano MD    Procedure: Procedure(s):  EXAM UNDER ANESTHESIA, FISSURECTOMY, PARTIAL LATERAL INTERNAL SPHCINTEROTOMY  . Medications prior to admission:   Prior to Admission medications    Medication Sig Start Date End Date Taking? Authorizing Provider   risperiDONE (RISPERDAL) 1 MG tablet  3/31/22   Historical Provider, MD   docusate sodium (COLACE) 100 MG capsule Take 1 capsule by mouth 2 times daily as needed for Constipation 22   Paramjit Leong MD   hydrocortisone (ANUSOL-HC) 2.5 % CREA rectal cream Place rectally 2 times daily as needed for Hemorrhoids 22   Paramjit Leong MD   sertraline (ZOLOFT) 100 MG tablet  21   Historical Provider, MD Julietta Dakins 546 MG/1.75ML RONNIE IM injection  21   Historical Provider, MD       Current medications:    Current Facility-Administered Medications   Medication Dose Route Frequency Provider Last Rate Last Admin    lactated ringers infusion   IntraVENous Continuous Mino Loan,  mL/hr at 22 1143 New Bag at 22 1143    0.9 % sodium chloride infusion   IntraVENous PRN Alejandra Campuzano MD        sodium chloride flush 0.9 % injection 5-40 mL  5-40 mL IntraVENous 2 times per day Alejandra Campuzano MD        sodium chloride flush 0.9 % injection 5-40 mL  5-40 mL IntraVENous PRN Alejandra Campuzano MD           Allergies:     Allergies   Allergen Reactions    Amoxicillin Hives       Problem List:    Patient Active Problem List   Diagnosis Code    Schizophreniform disorder with good prognostic features ([facility] [address] F20.81    Psychological trauma F09    Tourette's syndrome F95.2    Impaired social interaction Z73.4    Suicidal ideation R45.851    History of OCD (obsessive compulsive disorder) Z86.59    Laceration of right hand without foreign body S61.411A    Diarrhea R19.7    Grade III hemorrhoids K64.2       Past Medical History:        Diagnosis Date    ADHD (attention deficit hyperactivity disorder)     dx young child    Bipolar 1 disorder, manic, mild ([address]     dx as a child    History of OCD (obsessive compulsive disorder)     dx as a child    Rectal bleeding     Tourette syndrome     dx as a child       Past Surgical History:        Procedure Laterality Date    TONSILLECTOMY         Social History:    Social History     Tobacco Use    Smoking status: Current Some Day Smoker     Packs/day: 0.25     Types: Cigarettes    Smokeless tobacco: Never Used   Substance Use Topics    Alcohol use: Not Currently                                Ready to quit: Not Answered  Counseling given: Not Answered      Vital Signs (Current):   Vitals:    05/18/22 1126   Weight: (!) 310 lb (140.6 kg)   Height: 6' 5\" (1.956 m)                                              BP Readings from Last 3 Encounters:   04/28/22 124/85   04/20/22 110/70   02/22/22 129/83       NPO Status: Time of last liquid consumption: 2200                        Time of last solid consumption: 2200                        Date of last liquid consumption: 05/22/22                        Date of last solid food consumption: 05/22/22    BMI:   Wt Readings from Last 3 Encounters:   05/18/22 (!) 310 lb (140.6 kg)   04/28/22 (!) 306 lb 6.4 oz (139 kg)   04/20/22 (!) 308 lb 9.6 oz (140 kg)     Body mass index is 36.76 kg/m².     CBC:   Lab Results   Component Value Date    WBC 7.3 04/20/2022    RBC 5.41 04/20/2022    HGB 15.2 04/20/2022    HCT 47.0 04/20/2022    MCV 86.8 04/20/2022    RDW 14.3 04/20/2022     04/20/2022       CMP:   Lab Results   Component Value Date     04/20/2022    K 4.5 04/20/2022     04/20/2022    CO2 23 04/20/2022    BUN 14 04/20/2022    CREATININE 1.2 04/20/2022    GFRAA >60 04/20/2022    AGRATIO 1.9 04/20/2022 LABGLOM >60 04/20/2022    GLUCOSE 86 04/20/2022    PROT 6.9 04/20/2022    PROT 6.9 05/28/2010    CALCIUM 9.4 04/20/2022    BILITOT 0.3 04/20/2022    ALKPHOS 93 04/20/2022    AST 15 04/20/2022    ALT 18 04/20/2022       POC Tests: No results for input(s): POCGLU, POCNA, POCK, POCCL, POCBUN, POCHEMO, POCHCT in the last 72 hours. Coags: No results found for: PROTIME, INR, APTT    HCG (If Applicable): No results found for: PREGTESTUR, PREGSERUM, HCG, HCGQUANT     ABGs: No results found for: PHART, PO2ART, XRM5POU, YQD5KKK, BEART, Y9SNCVXA     Type & Screen (If Applicable):  No results found for: LABABO, LABRH    Drug/Infectious Status (If Applicable):  No results found for: HIV, HEPCAB    COVID-19 Screening (If Applicable): No results found for: COVID19        Anesthesia Evaluation    Airway: Mallampati: II  TM distance: >3 FB   Neck ROM: full  Mouth opening: > = 3 FB   Dental:          Pulmonary:                              Cardiovascular:            Rhythm: regular  Rate: normal                    Neuro/Psych:               GI/Hepatic/Renal:             Endo/Other:                     Abdominal:             Vascular: Other Findings:           Anesthesia Plan      general     ASA 2       Induction: intravenous. Anesthetic plan and risks discussed with patient. Plan discussed with CRNA.                   Pierre Youssef MD   5/23/2022

## 2022-05-23 NOTE — OP NOTE
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 Lateral partial internal sphincterotomy  2. Fissurectomy     SURGEON: MD Alexei Aguilar, PGY-1, resident     ANESTHESIA: GET     COMPLICATIONS: None. EBL: minimal     INDICATIONS: The patient is a 29-year-old male who has had symptoms of a chronic anal fissure. Both conservative and medical management have been attempted, but patient continues to have symptoms. The risks, benefits, and alternatives of procedure were explained to the patient including risks of bleeding, infection, pelvic sepsis, urinary retention, anal stenosis, and potential  sphincter damage and dysfunction, and incontinence. Patient understood all of these risks and agreed to proceed. Typical postoperative course was discussed, including pain and likely need for up to 3 weeks of recovery. PROCEDURE DETAILS:   The patient was brought to the operating theater. General endotracheal intubation performed by anesthesia. The patient was placed in the prone danette-knife position and the bed was flexed and patient secured using 2 large straps. Buttocks were taped apart carefully using tape. The patient's perianal region was prepped and draped in usual sterile fashion using Betadine prep solution. Time-out was performed confirming the patient's identity as well as the operative site. Antibiotics were not indicated. SCDs were on and functioning. All safety points were followed. Digital rectal exam and anoscopy was performed in all 4 quadrants using lighted anal retractor, noting anterior midline anal fissure. The internal sphincter muscle was noted to be mildly hypertonic as well. Cut cautery used to excise the hypertrophic skin tag associated with the fissure and sent for permanent examination.  Base of fissure cauterized and hemostasis insured. The left lateral anal canal was exposed, and a 1 cm transverse incision was made at the intersphincteric groove. Hemostat was used to expose and lift the internal sphincter muscle, which was then divided using cautery. The extent of the sphincterotomy was performed to the proximal extent of the fissure, thus a partial lateral internal sphincterotomy was performed. Hemostasis was obtained using cautery. The mucosal wound was closed using a running 2-0 chromic suture. Anoscopy was then performed again, confirming complete hemostasis. The wound was irrigated with saline. 10 cc of Expirel anesthetic was injected for local anesthetic     Fluffs, ABD pads, and mesh pants were used for dressing. The patient tolerated the procedure well, was woken up and brought to the PACU in stable condition. All counts were correct x2 at the end of the procedure. No complications. Reva Charles.  Noel Ruiz MD    Electronically signed by Toño Romero MD on 5/23/2022 at 1:20 PM

## 2022-05-23 NOTE — PROGRESS NOTES
PACU Discharge Note    EXAM UNDER ANESTHESIA, FISSURECTOMY, PARTIAL Victor Hugo Davis     Current Allergies: Amoxicillin    Pt meets criteria for discharge to home per Ab Score and ASPAN standards. Discussed with patient and responsible individual receiving instructions how to measure pain per numerical scale and when to contact doctor if prescribed medications are not helping with post operative pain    Discharge instructions reviewed with patient and family. Both verbalized understanding of instructions. Gave patient and family opportunity to ask questions. All questions reviewed and answered. Documents signed and copy of discharge instructions given. Vitals:    05/23/22 1430   BP: 135/82   Pulse: 79   Resp: 16   Temp: 97.5 °F (36.4 °C)   SpO2: 95%      Tolerated ice chips  Advanced to soda and crackers    Pain assessment:  none  Pain Level: 0    Offered patient opportunity to use restroom prior to discharge.  Patient declined need at this time    Patient discharged to home/self care via wheel chair by transporter  with a responsible individual. Mother Deniz Lozano      5/23/2022 2:44 PM

## 2022-05-23 NOTE — ANESTHESIA POSTPROCEDURE EVALUATION
Department of Anesthesiology  Postprocedure Note    Patient: Jaylon Sy  MRN: [id number]  YOB: 1995  Date of evaluation: 5/23/2022  Time:  3:28 PM     Procedure Summary     Date: 05/23/22 Room / Location: [address] [place] [address]    Anesthesia Start: 1702 Anesthesia Stop: 1465    Procedure: EXAM UNDER ANESTHESIA, FISSURECTOMY, PARTIAL LATERAL INTERNAL SPHCINTEROTOMY (N/A Rectum) Diagnosis:       Anal fissure      History of OCD (obsessive compulsive disorder)      (Anal fissure [K60.2] History of OCD (obsessive compulsive disorder) [Z86.59])    Surgeons: Ingris Pryor MD Responsible Provider: Linus Gustafson MD    Anesthesia Type: general ASA Status: 2          Anesthesia Type: No value filed. Ab Phase I: Ab Score: 10    Ab Phase II:      Last vitals: Reviewed and per EMR flowsheets.        Anesthesia Post Evaluation    Patient location during evaluation: PACU  Patient participation: complete - patient participated  Level of consciousness: awake and alert  Airway patency: patent  Nausea & Vomiting: no nausea and no vomiting  Cardiovascular status: blood pressure returned to baseline  Respiratory status: acceptable  Hydration status: euvolemic

## 2022-05-23 NOTE — H&P
Mikafarzaneh May    [id number]    [facility] Same Day Surgery Update H & P  Department of General Surgery   Surgical Service   Pre-operative History and Physical  Last H & P within the last 30 days. DIAGNOSIS:   Anal fissure [K60.2]  History of OCD (obsessive compulsive disorder) [Z86.59]    Procedure(s):  EXAM UNDER ANESTHESIA, FISSURECTOMY, PARTIAL LATERAL INTERNAL SPHCINTEROTOMY  . History obtained from: Patient interview and EHR      HISTORY OF PRESENT ILLNESS:   The patient is a 24 y.o. male with c/o pain and bleeding in the setting of anal fissure which has been unrelieved with medial therapy. presents today for the above procedure     Illness Screening: Patient denies fever, chills, worsening cough, or close contact with sick individuals. Past Medical History:        Diagnosis Date    ADHD (attention deficit hyperactivity disorder)     dx young child    Bipolar 1 disorder, manic, mild ([facility] [address]     dx as a child    History of OCD (obsessive compulsive disorder)     dx as a child    Rectal bleeding     Tourette syndrome     dx as a child     Past Surgical History:        Procedure Laterality Date    TONSILLECTOMY         Medications Prior to Admission:      Prior to Admission medications    Medication Sig Start Date End Date Taking?  Authorizing Provider   risperiDONE (RISPERDAL) 1 MG tablet  3/31/22   Historical Provider, MD   docusate sodium (COLACE) 100 MG capsule Take 1 capsule by mouth 2 times daily as needed for Constipation 4/20/22   Robert Marte MD   hydrocortisone (ANUSOL-HC) 2.5 % CREA rectal cream Place rectally 2 times daily as needed for Hemorrhoids 4/20/22   Robert Marte MD   sertraline (ZOLOFT) 100 MG tablet  12/29/21   Historical Provider, MD Josefina Barbour 546 MG/1.75ML RONNIE IM injection  1/20/21   Historical Provider, MD         Allergies:  Amoxicillin    PHYSICAL EXAM:      /87   Pulse 65   Temp 98.4 °F (36.9 °C) (Temporal)   Resp 16   Ht 6' 5.25\" (1.962 m)   Wt (!) 311 lb 12.8 oz (141.4 kg)   SpO2 97%   BMI 36.74 kg/m²      Airway:  Airway patent with no audible stridor    Heart:  Regular rate and rhythm, No murmur noted    Lungs:  No increased work of breathing, good air exchange, clear to auscultation bilaterally, no crackles or wheezing    Abdomen:  Soft, non-distended, non-tender, no rebound tenderness or guarding, and no masses palpated    ASSESSMENT AND PLAN     Patient is a 24 y.o. male with above specified procedure planned. 1.  The patients history and physical was obtained and signed off by the pre-admission testing department. Patient seen and focused exam done today- no new changes since last physical exam on 4/28/22    2. Access to ancillary services are available per request of the provider.     LIBBY Gómez - CNP     5/23/2022

## 2022-05-23 NOTE — PROGRESS NOTES
EXAM UNDER ANESTHESIA, FISSURECTOMY, PARTIAL LATERAL Clarissa Class  Dr Jones Presume    Current Allergies: Amoxicillin    No results for input(s): POCGLU in the last 72 hours. Admitted to PACU bed 6 from OR. Arrived on a stretcher . Attached to PACU monitoring system. Alarms and parameters set. Report received from anesthesia personnel. OR staff did not report skin issues that were observed while in OR  No problems reported intraoperatively. Pt arrived with oxygen per simple mask with oxygen at 10 liters. Athrombic wraps in place. Removed for discharge    Doctors aware of all labs before coming to recovery.

## 2022-06-15 ENCOUNTER — OFFICE VISIT (OUTPATIENT)
Dept: SURGERY | Age: 27
End: 2022-06-15

## 2022-06-15 VITALS
SYSTOLIC BLOOD PRESSURE: 113 MMHG | HEIGHT: 77 IN | BODY MASS INDEX: 36.6 KG/M2 | RESPIRATION RATE: 16 BRPM | DIASTOLIC BLOOD PRESSURE: 79 MMHG | HEART RATE: 86 BPM | WEIGHT: 310 LBS | OXYGEN SATURATION: 97 % | TEMPERATURE: 98.2 F

## 2022-06-15 DIAGNOSIS — K60.2 ANAL FISSURE: Primary | ICD-10-CM

## 2022-06-15 PROCEDURE — 99024 POSTOP FOLLOW-UP VISIT: CPT | Performed by: SURGERY

## 2022-06-15 NOTE — PROGRESS NOTES
[address] [facility] COLORECTAL SURGERY  [address]  Dept: [phone]  Dept Fax: [phone]  Loc: [phone]    Visit Date: 6/15/2022    Natalia Silva is a 24 y.o. male who presents today for: Post-Op Check (Post-Op EUA, Fiss, Sphinc 5/23/22 (Per patient he has no current pain but reports bleeding with and without BM) )      Subjective:     Natalia Silva is a 24 y.o. male here for postoperative visit after EUA, fissurectomy, partial lateral internal sphincterotomy about 3 weeks ago. Pain minimal, but still having a little bit of bleeding. Overall seems to be getting better. Patient's problem list, medications, past medical, surgical, family, and social histories were reviewed and updated in the chart as indicated today. Objective:     /79   Pulse 86   Temp 98.2 °F (36.8 °C) (Infrared)   Resp 16   Ht 6' 5.25\" (1.962 m)   Wt (!) 310 lb (140.6 kg)   SpO2 97%   BMI 36.52 kg/m²     Abdominal/wound: Still with sphincterotomy and fissure wounds noticeable, but no signs of infection, slowly improving    Assessment/Plan:       ASSESSMENT/PLAN:    3-week status post EUA, fissurectomy, partial lateral internal sphincterotomy. Overall wounds appear to be appropriate. Discussed with him that these can take several weeks to completely heal given the location. DISPOSITION: Follow-up in 6 to 8 weeks for another wound check    Note completed using dictation software, please excuse any errors. Referring/primary care physician updated through [facility] note if PCP was listed.     Electronically signed by Alejandra Campuzano MD on 6/15/2022 at 2:03 PM

## 2022-08-22 ENCOUNTER — TELEPHONE (OUTPATIENT)
Dept: PRIMARY CARE CLINIC | Age: 27
End: 2022-08-22

## 2022-08-22 DIAGNOSIS — Z23 NEED FOR TUBERCULOSIS VACCINATION: Primary | ICD-10-CM

## 2022-08-22 NOTE — TELEPHONE ENCOUNTER
Form placed on desk for you review, would like to know if last visit can be used to fill form out and pt also needs a TB done- order pended

## 2022-08-22 NOTE — TELEPHONE ENCOUNTER
Cindy/care coordinator dropped off initial health assessment paperwork to be filled out by provider, gave original copy to rep & scanned copy in media; please call Cindy/care coordinator when completed at [phone].

## 2022-09-02 DIAGNOSIS — K59.04 CHRONIC IDIOPATHIC CONSTIPATION: ICD-10-CM

## 2022-09-02 DIAGNOSIS — K60.2 ANAL FISSURE: ICD-10-CM

## 2022-09-02 NOTE — TELEPHONE ENCOUNTER
Medication:   Requested Prescriptions     Pending Prescriptions Disp Refills    docusate sodium (COLACE) 100 MG capsule 60 capsule 3     Sig: Take 1 capsule by mouth 2 times daily as needed for Constipation (Take to keep your stools loose post-op)        Last Filled:      Patient Phone Number: [phone] (home)     Last appt: 4/20/2022   Next appt: Visit date not found    Last OARRS:   RX Monitoring 6/4/2018   Attestation The Prescription Monitoring Report for this patient was reviewed today. Periodic Controlled Substance Monitoring No signs of potential drug abuse or diversion identified.

## 2022-09-03 RX ORDER — DOCUSATE SODIUM 100 MG/1
100 CAPSULE, LIQUID FILLED ORAL 2 TIMES DAILY PRN
Qty: 60 CAPSULE | Refills: 0 | Status: SHIPPED | OUTPATIENT
Start: 2022-09-03 | End: 2022-10-07 | Stop reason: SDUPTHER

## 2022-09-08 ENCOUNTER — TELEPHONE (OUTPATIENT)
Dept: PRIMARY CARE CLINIC | Age: 27
End: 2022-09-08

## 2022-09-08 NOTE — TELEPHONE ENCOUNTER
[employer] called and wants Mr. Annette Hyatt faxed to them   brought it in August. It is needed ASAP so patient does not lose his housing. Papers are in [facility]. Please advise and fax to [email or web address] She says her email is HIPPAA compliant.

## 2022-10-06 DIAGNOSIS — K59.04 CHRONIC IDIOPATHIC CONSTIPATION: ICD-10-CM

## 2022-10-06 DIAGNOSIS — K60.2 ANAL FISSURE: ICD-10-CM

## 2022-10-06 NOTE — TELEPHONE ENCOUNTER
Medication:   Requested Prescriptions     Pending Prescriptions Disp Refills    docusate sodium (COLACE) 100 MG capsule 60 capsule 0     Sig: Take 1 capsule by mouth 2 times daily as needed for Constipation (Take to keep your stools loose post-op)        Last Filled:      Patient Phone Number: [phone] (home)     Last appt: 4/20/2022   Next appt: Visit date not found    Last OARRS:   RX Monitoring 6/4/2018   Attestation The Prescription Monitoring Report for this patient was reviewed today. Periodic Controlled Substance Monitoring No signs of potential drug abuse or diversion identified.

## 2022-10-07 RX ORDER — DOCUSATE SODIUM 100 MG/1
100 CAPSULE, LIQUID FILLED ORAL 2 TIMES DAILY PRN
Qty: 60 CAPSULE | Refills: 0 | Status: SHIPPED | OUTPATIENT
Start: 2022-10-07

## 2022-12-21 DIAGNOSIS — K59.04 CHRONIC IDIOPATHIC CONSTIPATION: ICD-10-CM

## 2022-12-21 DIAGNOSIS — K60.2 ANAL FISSURE: ICD-10-CM

## 2022-12-22 RX ORDER — HYDROCORTISONE 25 MG/G
CREAM TOPICAL
Qty: 28 G | Refills: 0 | Status: SHIPPED | OUTPATIENT
Start: 2022-12-22

## 2022-12-22 NOTE — TELEPHONE ENCOUNTER
Medication:   Requested Prescriptions     Pending Prescriptions Disp Refills    PROCTO-MED HC 2.5 % CREA rectal cream [Pharmacy Med Name: Procto-Med HC 2.5% CREA] 28 g      Sig: PLACE RECTALLY 2 TIMES DAILY AS NEEDED FOR HEMORRHOIDS        Last Filled:      Patient Phone Number: [phone] (home)     Last appt: 4/20/2022   Next appt: Visit date not found    Last OARRS:   RX Monitoring 6/4/2018   Attestation The Prescription Monitoring Report for this patient was reviewed today. Periodic Controlled Substance Monitoring No signs of potential drug abuse or diversion identified.

## 2023-01-19 DIAGNOSIS — K59.04 CHRONIC IDIOPATHIC CONSTIPATION: ICD-10-CM

## 2023-01-19 DIAGNOSIS — K60.2 ANAL FISSURE: ICD-10-CM

## 2023-01-19 RX ORDER — HYDROCORTISONE 25 MG/G
CREAM TOPICAL
Qty: 28 G | Refills: 0 | Status: SHIPPED | OUTPATIENT
Start: 2023-01-19

## 2023-01-19 NOTE — TELEPHONE ENCOUNTER
Medication:   Requested Prescriptions     Pending Prescriptions Disp Refills    PROCTO-MED HC 2.5 % CREA rectal cream [Pharmacy Med Name: Procto-Med HC 2.5% CREA] 28 g 0     Sig: PLACE RECTALLY 2 TIMES DAILY AS NEEDED FOR HEMORRHOIDS        Last Filled:      Patient Phone Number: [phone] (home)     Last appt: 4/20/2022   Next appt: Visit date not found    Last OARRS:   RX Monitoring 6/4/2018   Attestation The Prescription Monitoring Report for this patient was reviewed today. Periodic Controlled Substance Monitoring No signs of potential drug abuse or diversion identified.

## 2023-01-20 DIAGNOSIS — K60.2 ANAL FISSURE: ICD-10-CM

## 2023-01-20 DIAGNOSIS — K59.04 CHRONIC IDIOPATHIC CONSTIPATION: ICD-10-CM

## 2023-01-20 NOTE — TELEPHONE ENCOUNTER
Medication:   Requested Prescriptions     Pending Prescriptions Disp Refills    docusate sodium (COLACE) 100 MG capsule 60 capsule 0     Sig: Take 1 capsule by mouth 2 times daily as needed for Constipation (Take to keep your stools loose post-op)        Last Filled:      Patient Phone Number: [phone] (home)     Last appt: 4/20/2022   Next appt: Visit date not found    Last OARRS:   RX Monitoring 6/4/2018   Attestation The Prescription Monitoring Report for this patient was reviewed today. Periodic Controlled Substance Monitoring No signs of potential drug abuse or diversion identified.

## 2023-01-24 RX ORDER — DOCUSATE SODIUM 100 MG/1
100 CAPSULE, LIQUID FILLED ORAL 2 TIMES DAILY PRN
Qty: 60 CAPSULE | Refills: 0 | Status: SHIPPED | OUTPATIENT
Start: 2023-01-24

## 2023-02-13 DIAGNOSIS — K60.2 ANAL FISSURE: ICD-10-CM

## 2023-02-13 DIAGNOSIS — K59.04 CHRONIC IDIOPATHIC CONSTIPATION: ICD-10-CM

## 2023-02-14 DIAGNOSIS — K59.04 CHRONIC IDIOPATHIC CONSTIPATION: ICD-10-CM

## 2023-02-14 DIAGNOSIS — K60.2 ANAL FISSURE: ICD-10-CM

## 2023-02-14 RX ORDER — HYDROCORTISONE 25 MG/G
CREAM TOPICAL
Qty: 28 G | Refills: 0 | Status: SHIPPED | OUTPATIENT
Start: 2023-02-14

## 2023-02-14 NOTE — TELEPHONE ENCOUNTER
Medication:   Requested Prescriptions     Pending Prescriptions Disp Refills    docusate sodium (COLACE) 100 MG capsule 60 capsule 0     Sig: Take 1 capsule by mouth 2 times daily as needed for Constipation (Take to keep your stools loose post-op)        Last Filled:      Patient Phone Number: [phone] (home)     Last appt: 4/20/2022   Next appt: Visit date not found    Last OARRS:   RX Monitoring 6/4/2018   Attestation The Prescription Monitoring Report for this patient was reviewed today. Periodic Controlled Substance Monitoring No signs of potential drug abuse or diversion identified.

## 2023-02-14 NOTE — TELEPHONE ENCOUNTER
Medication:   Requested Prescriptions     Pending Prescriptions Disp Refills    PROCTO-MED HC 2.5 % CREA rectal cream [Pharmacy Med Name: Procto-Med HC 2.5% CREA] 28 g 0     Sig: PLACE RECTALLY 2 TIMES DAILY AS NEEDED FOR HEMORRHOIDS        Last Filled:      Patient Phone Number: [phone] (home)     Last appt: 4/20/2022   Next appt: Visit date not found    Last OARRS:   RX Monitoring 6/4/2018   Attestation The Prescription Monitoring Report for this patient was reviewed today. Periodic Controlled Substance Monitoring No signs of potential drug abuse or diversion identified.

## 2023-02-15 RX ORDER — DOCUSATE SODIUM 100 MG/1
100 CAPSULE, LIQUID FILLED ORAL 2 TIMES DAILY PRN
Qty: 60 CAPSULE | Refills: 0 | Status: SHIPPED | OUTPATIENT
Start: 2023-02-15

## 2024-12-12 ENCOUNTER — OFFICE VISIT (OUTPATIENT)
Dept: SURGERY | Age: 29
End: 2024-12-12
Payer: COMMERCIAL

## 2024-12-12 ENCOUNTER — TELEPHONE (OUTPATIENT)
Dept: SURGERY | Age: 29
End: 2024-12-12

## 2024-12-12 VITALS
HEART RATE: 105 BPM | OXYGEN SATURATION: 95 % | BODY MASS INDEX: 40.39 KG/M2 | WEIGHT: 315 LBS | RESPIRATION RATE: 16 BRPM

## 2024-12-12 DIAGNOSIS — K62.5 RECTAL BLEED: ICD-10-CM

## 2024-12-12 DIAGNOSIS — K60.2 ANAL FISSURE: Primary | ICD-10-CM

## 2024-12-12 DIAGNOSIS — K64.2 GRADE III HEMORRHOIDS: ICD-10-CM

## 2024-12-12 PROCEDURE — 99214 OFFICE O/P EST MOD 30 MIN: CPT | Performed by: SURGERY

## 2024-12-12 NOTE — TELEPHONE ENCOUNTER
LVM for patients mother letting her know that  sent over a Ref to [facility] to see . [phone]

## 2024-12-12 NOTE — PROGRESS NOTES
[facility] SPECIALTY CARE [facility] PHYSICIANS [place] COLON AND RECTAL SURGERY  [address] [facility]  [address]  [facility] [address]  Dept: [phone]  Dept Fax: [phone]  Loc: [phone]    Visit Date: 12/12/2024    Patrick Hayden is a 29 y.o. male who presents today for: Follow-up (Rectal Bleeding/)      HPI:       Patrick Hayden is a 29 y.o. male for history of anal fissure, status post fissurectomy and possible sphincterotomy 2.5 years ago.  He is now back with discomfort and rectal bleeding.  No previous colonoscopy.  No family history of colorectal cancer.    Objective:     Physical Exam   Pulse (!) 105   Resp 16   Wt (!) 155.5 kg (342 lb 12.8 oz)   SpO2 95%   BMI 40.39 kg/m²   Constitutional: Appears well-developed and well-nourished. Grooming appropriate. No gross deformities. Body mass index is 40.39 kg/m².    Abdominal/wound: Soft, obese    Anorectal Exam: Chaperone present in room throughout exam.  Patient placed in the left lateral position.  Buttocks spread.    Digital rectal exam performed with lubricated finger.  Anoscopy performed.    Findings reveal: Old healed fissure noted, mild internal hemorrhoids, no obvious bleeding source noted    Labs reviewed: None  Imaging reviewed: None  Coordination/discussion of care: None    No colonoscopy on file   No cologuard on file  No FIT/FOBT on file   No flexible sigmoidoscopy on file      Assessment/Plan:       A/P:  New problem(s) with uncertain prognosis: Rectal bleeding of unknown etiology  Established problem(s): History of anal fissure  Additional workup/treatment planned: Colonoscopy with hemorrhoid banding  Risk of complications/morbidity: Raleigh Nguyen is known to me for anal fissure in the past.  On office exam I do not see any active source of bleeding and his hemorrhoids are quite mild.  I discussed attempting hemorrhoid banding in the office versus scheduling for colonoscopy and banding at the same

## 2025-02-14 ENCOUNTER — TELEPHONE (OUTPATIENT)
Dept: SURGERY | Age: 30
End: 2025-02-14

## 2025-06-13 ENCOUNTER — TELEPHONE (OUTPATIENT)
Dept: SURGERY | Age: 30
End: 2025-06-13

## 2025-06-13 NOTE — TELEPHONE ENCOUNTER
Called patient to schedule Colonoscopy, male answered phone with a WHAT, I asked may I speak to Patrick, he then hung up.

## (undated) DEVICE — GLOVE SURG SZ 7 CRM LTX FREE POLYISOPRENE POLYMER BEAD ANTI

## (undated) DEVICE — POSITIONER HD REST FOAM CMFRT TCH

## (undated) DEVICE — DRAPE,UNDERBUTTOCKS,PCH,STERILE: Brand: MEDLINE

## (undated) DEVICE — SHEET,T,THYROID,STERILE: Brand: MEDLINE

## (undated) DEVICE — PAD,NON-ADHERENT,3X8,STERILE,LF,1/PK: Brand: MEDLINE

## (undated) DEVICE — GAUZE FLUFF 1 PLY: Brand: DEROYAL

## (undated) DEVICE — RECTAL: Brand: MEDLINE INDUSTRIES, INC.

## (undated) DEVICE — TOWEL,STOP FLAG GOLD N-W: Brand: MEDLINE